# Patient Record
Sex: FEMALE | Race: WHITE | NOT HISPANIC OR LATINO | Employment: FULL TIME | URBAN - METROPOLITAN AREA
[De-identification: names, ages, dates, MRNs, and addresses within clinical notes are randomized per-mention and may not be internally consistent; named-entity substitution may affect disease eponyms.]

---

## 2017-01-28 ENCOUNTER — HOSPITAL ENCOUNTER (OUTPATIENT)
Dept: RADIOLOGY | Facility: MEDICAL CENTER | Age: 52
Discharge: HOME | End: 2017-01-28
Payer: COMMERCIAL

## 2017-01-28 DIAGNOSIS — Z12.31 ENCOUNTER FOR SCREENING MAMMOGRAM FOR MALIGNANT NEOPLASM OF BREAST: ICD-10-CM

## 2017-01-28 PROCEDURE — G0202 SCR MAMMO BI INCL CAD: HCPCS

## 2018-05-21 ENCOUNTER — TRANSCRIBE ORDERS (OUTPATIENT)
Dept: ADMINISTRATIVE | Facility: HOSPITAL | Age: 53
End: 2018-05-21

## 2018-05-21 DIAGNOSIS — Z12.31 ENCOUNTER FOR SCREENING MAMMOGRAM FOR MALIGNANT NEOPLASM OF BREAST: Primary | ICD-10-CM

## 2018-05-25 ENCOUNTER — HOSPITAL ENCOUNTER (OUTPATIENT)
Dept: MAMMOGRAPHY | Facility: CLINIC | Age: 53
Discharge: HOME | End: 2018-05-25
Payer: COMMERCIAL

## 2018-05-25 DIAGNOSIS — Z12.31 ENCOUNTER FOR SCREENING MAMMOGRAM FOR MALIGNANT NEOPLASM OF BREAST: ICD-10-CM

## 2018-05-25 PROCEDURE — 77067 SCR MAMMO BI INCL CAD: CPT

## 2018-11-06 ENCOUNTER — TELEPHONE (OUTPATIENT)
Dept: OBGYN CLINIC | Facility: HOSPITAL | Age: 53
End: 2018-11-06

## 2018-11-06 ENCOUNTER — APPOINTMENT (OUTPATIENT)
Dept: RADIOLOGY | Facility: MEDICAL CENTER | Age: 53
End: 2018-11-06
Payer: COMMERCIAL

## 2018-11-06 ENCOUNTER — OFFICE VISIT (OUTPATIENT)
Dept: URGENT CARE | Facility: MEDICAL CENTER | Age: 53
End: 2018-11-06
Payer: COMMERCIAL

## 2018-11-06 VITALS
RESPIRATION RATE: 16 BRPM | DIASTOLIC BLOOD PRESSURE: 81 MMHG | SYSTOLIC BLOOD PRESSURE: 120 MMHG | OXYGEN SATURATION: 95 % | HEIGHT: 64 IN | TEMPERATURE: 99.2 F | HEART RATE: 87 BPM | BODY MASS INDEX: 28.68 KG/M2 | WEIGHT: 168 LBS

## 2018-11-06 DIAGNOSIS — S83.002A SUBLUXATION OF LEFT PATELLA, INITIAL ENCOUNTER: ICD-10-CM

## 2018-11-06 DIAGNOSIS — M25.462 EFFUSION OF BURSA OF LEFT KNEE: ICD-10-CM

## 2018-11-06 DIAGNOSIS — M25.562 ACUTE PAIN OF LEFT KNEE: Primary | ICD-10-CM

## 2018-11-06 PROCEDURE — 73562 X-RAY EXAM OF KNEE 3: CPT

## 2018-11-06 PROCEDURE — 99213 OFFICE O/P EST LOW 20 MIN: CPT | Performed by: PHYSICIAN ASSISTANT

## 2018-11-06 RX ORDER — IBUPROFEN 800 MG/1
800 TABLET ORAL EVERY 6 HOURS PRN
Qty: 30 TABLET | Refills: 0 | Status: SHIPPED | OUTPATIENT
Start: 2018-11-06 | End: 2019-02-01 | Stop reason: ALTCHOICE

## 2018-11-06 NOTE — PATIENT INSTRUCTIONS
X-ray left knee performed in office-patellar dislocation on xray  Prescription sent to pharmacy for ibuprofen-use as directed  Rest, elevate leg, ice  Follow up with orthopedics immediately  Proceed to  ER if symptoms worsen  Patellar Dislocation   WHAT YOU NEED TO KNOW:   A patellar dislocation occurs when your patella (kneecap) is forced out of place  It can be caused by a fall or a direct blow to your knee  It can also happen if your knee twists or rotates  It is most likely to happen during physical activity, such as sports, [employer] training, or dance  DISCHARGE INSTRUCTIONS:   You may need any of the following:  · NSAIDs , such as ibuprofen, help decrease swelling, pain, and fever  This medicine is available with or without a doctor's order  NSAIDs can cause stomach bleeding or kidney problems in certain people  If you take blood thinner medicine, always ask if NSAIDs are safe for you  Always read the medicine label and follow directions  Do not give these medicines to children under 10months of age without direction from your child's healthcare provider  · Acetaminophen  decreases pain  It is available without a doctor's order  Ask how much to take and how often to take it  Follow directions  Acetaminophen can cause liver damage if not taken correctly  · Prescription pain medicine  may be given to decrease your pain  Do not wait until the pain is severe before you take this medicine  · Take your medicine as directed  Contact your healthcare provider if you think your medicine is not helping or if you have side effects  Tell him of her if you are allergic to any medicine  Keep a list of the medicines, vitamins, and herbs you take  Include the amounts, and when and why you take them  Bring the list or the pill bottles to follow-up visits  Carry your medicine list with you in case of an emergency    Follow up with your healthcare provider or orthopedic surgeon within 2 weeks or as directed:  Write down your questions so you remember to ask them during your visits  Self-care:   · Ice  helps decrease swelling and pain  Ice may also help prevent tissue damage  Use an ice pack, or put crushed ice in a plastic bag  Cover it with a towel and place it on your knee for 15 to 20 minutes every hour or as directed  · Raise your knee  above the level of your heart as often as you can  This will help decrease swelling and pain  Prop your knee on pillows or blankets to keep it elevated comfortably  · Immobilize your knee  for 3 to 6 weeks or as directed  Your healthcare provider may give you a brace, cast, or splint  He may tell you to wrap your knee with athletic tape  This is done to decrease pain and hold your knee joint still to help it heal  This may also help prevent your kneecap from dislocating again  · Use crutches  if your healthcare provider tells you not to put weight on your injured knee  Healthcare providers will show you how to use crutches  You may need them for 4 to 6 weeks  · Physical therapy  teaches you exercises to increase the range of motion in your knee  Exercises make your knee stronger, increase balance, and decrease pain  You may also need to strengthen your stomach, back, hip, and leg muscles  You must continue these exercises after physical therapy ends to help prevent another dislocation  Contact your healthcare provider:   · You have more knee pain  · Your knee feels like it is going to give out  · You have questions or concerns about your condition or care  Return to the emergency department if:   · Your kneecap dislocates again  · You have severe pain and swelling in your knee  © 2017 [facility] Information is for End User's use only and may not be sold, redistributed or otherwise used for commercial purposes   All illustrations and images included in CareNotes® are the copyrighted property of [employer]  or [facility] [employer]  The above information is an  only  It is not intended as medical advice for individual conditions or treatments  Talk to your doctor, nurse or pharmacist before following any medical regimen to see if it is safe and effective for you

## 2018-11-07 ENCOUNTER — OFFICE VISIT (OUTPATIENT)
Dept: OBGYN CLINIC | Facility: CLINIC | Age: 53
End: 2018-11-07
Payer: COMMERCIAL

## 2018-11-07 VITALS
BODY MASS INDEX: 29.99 KG/M2 | HEIGHT: 65 IN | HEART RATE: 103 BPM | WEIGHT: 180 LBS | SYSTOLIC BLOOD PRESSURE: 120 MMHG | DIASTOLIC BLOOD PRESSURE: 83 MMHG

## 2018-11-07 DIAGNOSIS — M17.12 PRIMARY OSTEOARTHRITIS OF LEFT KNEE: ICD-10-CM

## 2018-11-07 DIAGNOSIS — M25.362 KNEE INSTABILITY, LEFT: ICD-10-CM

## 2018-11-07 DIAGNOSIS — M25.562 PAIN AND SWELLING OF LEFT KNEE: ICD-10-CM

## 2018-11-07 DIAGNOSIS — M25.462 EFFUSION OF LEFT KNEE: Primary | ICD-10-CM

## 2018-11-07 DIAGNOSIS — M22.2X2 PATELLOFEMORAL SYNDROME, LEFT: ICD-10-CM

## 2018-11-07 DIAGNOSIS — M25.462 PAIN AND SWELLING OF LEFT KNEE: ICD-10-CM

## 2018-11-07 PROCEDURE — 20610 DRAIN/INJ JOINT/BURSA W/O US: CPT | Performed by: FAMILY MEDICINE

## 2018-11-07 PROCEDURE — 99204 OFFICE O/P NEW MOD 45 MIN: CPT | Performed by: FAMILY MEDICINE

## 2018-11-07 RX ORDER — TRIAMCINOLONE ACETONIDE 40 MG/ML
40 INJECTION, SUSPENSION INTRA-ARTICULAR; INTRAMUSCULAR
Status: COMPLETED | OUTPATIENT
Start: 2018-11-07 | End: 2018-11-07

## 2018-11-07 RX ORDER — BUPIVACAINE HYDROCHLORIDE 2.5 MG/ML
8 INJECTION, SOLUTION INFILTRATION; PERINEURAL
Status: COMPLETED | OUTPATIENT
Start: 2018-11-07 | End: 2018-11-07

## 2018-11-07 RX ADMIN — TRIAMCINOLONE ACETONIDE 40 MG: 40 INJECTION, SUSPENSION INTRA-ARTICULAR; INTRAMUSCULAR at 15:24

## 2018-11-07 RX ADMIN — BUPIVACAINE HYDROCHLORIDE 8 ML: 2.5 INJECTION, SOLUTION INFILTRATION; PERINEURAL at 15:24

## 2018-11-07 NOTE — LETTER
November 7, 2018     Yas Frausto, DO  [address] [address]    Patient: Abdi Sheldon   YOB: 1965   Date of Visit: 11/7/2018       Dear Dr Payne Fus: Thank you for referring Abdi Sheldon to me for evaluation  Below are my notes for this consultation  If you have questions, please do not hesitate to call me  I look forward to following your patient along with you  Sincerely,        [employer], DO        CC: No Recipients  [employer], DO  11/7/2018  4:44 PM  Sign at close encounter  Assessment/Plan:  Assessment/Plan   Diagnoses and all orders for this visit:    Effusion of left knee  -     Large joint arthrocentesis  -     MRI knee left  wo contrast; Future    Pain and swelling of left knee  -     Large joint arthrocentesis  -     MRI knee left  wo contrast; Future    Knee instability, left  -     MRI knee left  wo contrast; Future    Primary osteoarthritis of left knee    Patellofemoral syndrome, left        46year-old female with left knee pain  Discussed with patient physical exam, radiographs, impression, plan  X-ray of the left knee noted for tricompartmental arthritis with severe narrowing of the lateral patellofemoral joint and lateral subluxation patella, with mild to moderate narrowing of the lateral joint compartment, and mild tibiofemoral translation  Physical exam is noted for moderate effusion about the left knee and tenderness of the medial and lateral joint lines, tenderness upon palpation of patellar tendon, with weakness in knee extension of 2/5,  with passive range of motion extension to -5°  and flexion to 110°  I discussed treatment option of knee aspiration with corticosteroid injection to which she agreed  Knee aspiration was attempted and 80 cc of bloody aspirate was removed, so corticosteroid administration was deferred   Given the finding of a bloody aspirate, weakness with knee extension and patellar tendon tenderness, and x-ray findings of tibiofemoral translation, there is clinical suspicion for patellar tendon injury as well as ACL disruption  At this time I will have her continue wearing knee immobilizer and use of crutches  I will refer her for MRI of the left knee to evaluate for internal derangement  She will follow up with me after getting MRI done    Subjective:   Patient ID: Elena Limon is a 46 y o  female  Chief Complaint   Patient presents with    Left Knee - Pain       50-year-old female presents for evaluation of left knee pain, swelling, and instability of 2 days duration  She denies any trauma or inciting event  Pain described as gradual onset, generalized to the knee, constant, associated with swelling, achy and sharp, worse with bearing weight, and improved at rest   She presented to Urgent Care where x-ray evaluation was noted for significant arthritis, lateral patellar subluxation, and moderate joint effusion, and she will provided with knee immobilizer and crutches  She was prescribed ibuprofen which she states she has been taking twice a day which was providing mild relief  Prior to this she denies any knee pain or knee issues  Knee Pain   This is a new problem  The current episode started in the past 7 days  The problem occurs constantly  The problem has been unchanged  Associated symptoms include arthralgias and joint swelling  Pertinent negatives include no abdominal pain, chest pain, chills, fever, numbness, rash, sore throat or weakness  The symptoms are aggravated by standing and walking  She has tried rest, NSAIDs, ice and immobilization for the symptoms  The treatment provided mild relief  The following portions of the patient's history were reviewed and updated as appropriate: She  has a past medical history of Arthritis  She  has a past surgical history that includes Hip surgery (Left, 2014)  Her family history includes Cancer in her father and mother    She  reports that she has never smoked  She has never used smokeless tobacco  She reports that she does not drink alcohol or use drugs  She is allergic to amoxicillin and azithromycin       Review of Systems   Constitutional: Negative for chills and fever  HENT: Negative for sore throat  Eyes: Negative for visual disturbance  Respiratory: Negative for shortness of breath  Cardiovascular: Negative for chest pain  Gastrointestinal: Negative for abdominal pain  Genitourinary: Negative for flank pain  Musculoskeletal: Positive for arthralgias and joint swelling  Skin: Negative for rash and wound  Neurological: Negative for weakness and numbness  Hematological: Does not bruise/bleed easily  Psychiatric/Behavioral: Negative for self-injury  Objective:  Vitals:    11/07/18 1423   BP: 120/83   Pulse: 103   Weight: 81 6 kg (180 lb)   Height: 5' 4 5" (1 638 m)     Left Knee Exam     Tenderness   The patient is experiencing tenderness in the lateral joint line, patella, patellar tendon and medial joint line (Suprapatellar)  Range of Motion   Left knee extension: No active knee extension  Flexion: 100     Tests   Drawer:       Left knee anterior drawer test: Equivocal        Varus: negative  Valgus: negative    Other   Swelling: moderate  Effusion: effusion present    Comments:  2/5 strength knee extension          Observations   Left Knee   Positive for effusion  Physical Exam   Constitutional: She is oriented to person, place, and time  She appears well-developed  No distress  HENT:   Head: Normocephalic  Eyes: Conjunctivae are normal    Neck: No tracheal deviation present  Cardiovascular: Normal rate  Pulmonary/Chest: Effort normal  No respiratory distress  Abdominal: She exhibits no distension  Musculoskeletal:        Left knee: She exhibits effusion  Neurological: She is alert and oriented to person, place, and time  Skin: Skin is warm and dry     Psychiatric: She has a normal mood and affect  Her behavior is normal    Nursing note and vitals reviewed  I have personally reviewed pertinent films in PACS and my interpretation is Tricompartmental osteoarthritis with lateral patella subluxation      Large joint arthrocentesis  Date/Time: 11/7/2018 3:24 PM  Consent given by: patient  Site marked: site marked  Timeout: Immediately prior to procedure a time out was called to verify the correct patient, procedure, equipment, support staff and site/side marked as required   Supporting Documentation  Indications: pain   Procedure Details  Location: knee - L knee  Preparation: Patient was prepped and draped in the usual sterile fashion  Needle size: 18 G  Approach: superior  Medications administered: 8 mL bupivacaine 0 25 %; 40 mg triamcinolone acetonide 40 mg/mL    Aspirate amount: 80 mL  Aspirate: bloody  Patient tolerance: patient tolerated the procedure well with no immediate complications  Dressing:  Sterile dressing applied

## 2018-11-07 NOTE — PROGRESS NOTES
Assessment/Plan:  Assessment/Plan   Diagnoses and all orders for this visit:    Effusion of left knee  -     Large joint arthrocentesis  -     MRI knee left  wo contrast; Future    Pain and swelling of left knee  -     Large joint arthrocentesis  -     MRI knee left  wo contrast; Future    Knee instability, left  -     MRI knee left  wo contrast; Future    Primary osteoarthritis of left knee    Patellofemoral syndrome, left        46year-old female with left knee pain  Discussed with patient physical exam, radiographs, impression, plan  X-ray of the left knee noted for tricompartmental arthritis with severe narrowing of the lateral patellofemoral joint and lateral subluxation patella, with mild to moderate narrowing of the lateral joint compartment, and mild tibiofemoral translation  Physical exam is noted for moderate effusion about the left knee and tenderness of the medial and lateral joint lines, tenderness upon palpation of patellar tendon, with weakness in knee extension of 2/5,  with passive range of motion extension to -5° and flexion to 110°  I discussed treatment option of knee aspiration with corticosteroid injection to which she agreed  Knee aspiration was attempted and 80 cc of bloody aspirate was removed, so corticosteroid administration was deferred  Given the finding of a bloody aspirate, weakness with knee extension and patellar tendon tenderness, and x-ray findings of tibiofemoral translation, there is clinical suspicion for patellar tendon injury as well as ACL disruption  At this time I will have her continue wearing knee immobilizer and use of crutches  I will refer her for MRI of the left knee to evaluate for internal derangement  She will follow up with me after getting MRI done    Subjective:   Patient ID: Jose Maldonado is a 46 y o  female    Chief Complaint   Patient presents with    Left Knee - Pain       76-year-old female presents for evaluation of left knee pain, swelling, and instability of 2 days duration  She denies any trauma or inciting event  Pain described as gradual onset, generalized to the knee, constant, associated with swelling, achy and sharp, worse with bearing weight, and improved at rest   She presented to Urgent Care where x-ray evaluation was noted for significant arthritis, lateral patellar subluxation, and moderate joint effusion, and she will provided with knee immobilizer and crutches  She was prescribed ibuprofen which she states she has been taking twice a day which was providing mild relief  Prior to this she denies any knee pain or knee issues  Knee Pain   This is a new problem  The current episode started in the past 7 days  The problem occurs constantly  The problem has been unchanged  Associated symptoms include arthralgias and joint swelling  Pertinent negatives include no abdominal pain, chest pain, chills, fever, numbness, rash, sore throat or weakness  The symptoms are aggravated by standing and walking  She has tried rest, NSAIDs, ice and immobilization for the symptoms  The treatment provided mild relief  The following portions of the patient's history were reviewed and updated as appropriate: She  has a past medical history of Arthritis  She  has a past surgical history that includes Hip surgery (Left, 2014)  Her family history includes Cancer in her father and mother  She  reports that she has never smoked  She has never used smokeless tobacco  She reports that she does not drink alcohol or use drugs  She is allergic to amoxicillin and azithromycin       Review of Systems   Constitutional: Negative for chills and fever  HENT: Negative for sore throat  Eyes: Negative for visual disturbance  Respiratory: Negative for shortness of breath  Cardiovascular: Negative for chest pain  Gastrointestinal: Negative for abdominal pain  Genitourinary: Negative for flank pain     Musculoskeletal: Positive for arthralgias and joint swelling  Skin: Negative for rash and wound  Neurological: Negative for weakness and numbness  Hematological: Does not bruise/bleed easily  Psychiatric/Behavioral: Negative for self-injury  Objective:  Vitals:    11/07/18 1423   BP: 120/83   Pulse: 103   Weight: 81 6 kg (180 lb)   Height: 5' 4 5" (1 638 m)     Left Knee Exam     Tenderness   The patient is experiencing tenderness in the lateral joint line, patella, patellar tendon and medial joint line (Suprapatellar)  Range of Motion   Left knee extension: No active knee extension  Flexion: 100     Tests   Drawer:       Left knee anterior drawer test: Equivocal        Varus: negative  Valgus: negative    Other   Swelling: moderate  Effusion: effusion present    Comments:  2/5 strength knee extension          Observations   Left Knee   Positive for effusion  Physical Exam   Constitutional: She is oriented to person, place, and time  She appears well-developed  No distress  HENT:   Head: Normocephalic  Eyes: Conjunctivae are normal    Neck: No tracheal deviation present  Cardiovascular: Normal rate  Pulmonary/Chest: Effort normal  No respiratory distress  Abdominal: She exhibits no distension  Musculoskeletal:        Left knee: She exhibits effusion  Neurological: She is alert and oriented to person, place, and time  Skin: Skin is warm and dry  Psychiatric: She has a normal mood and affect  Her behavior is normal    Nursing note and vitals reviewed  I have personally reviewed pertinent films in PACS and my interpretation is Tricompartmental osteoarthritis with lateral patella subluxation      Large joint arthrocentesis  Date/Time: 11/7/2018 3:24 PM  Consent given by: patient  Site marked: site marked  Timeout: Immediately prior to procedure a time out was called to verify the correct patient, procedure, equipment, support staff and site/side marked as required   Supporting Documentation  Indications: pain   Procedure Details  Location: knee - L knee  Preparation: Patient was prepped and draped in the usual sterile fashion  Needle size: 18 G  Approach: superior  Medications administered: 8 mL bupivacaine 0 25 %; 40 mg triamcinolone acetonide 40 mg/mL    Aspirate amount: 80 mL  Aspirate: bloody  Patient tolerance: patient tolerated the procedure well with no immediate complications  Dressing:  Sterile dressing applied

## 2018-11-07 NOTE — PROGRESS NOTES
[facility]        NAME: Shamar Shelton is a 46 y o  female  : 1965    MRN: [id number]  DATE: 2018  TIME: 3:42 PM    Assessment and Plan   Acute pain of left knee [M25 562]  1  Acute pain of left knee  XR knee 3 vw left non injury     Dislocation of left patella, initial encounter    Patient Instructions   X-ray left knee performed in office-patellar dislocation on xray  Her left leg was placed in a knee immobilizer  Prescription sent to pharmacy for ibuprofen-use as directed  Rest, elevate leg, ice  Follow up with orthopedics immediately  Proceed to  ER if symptoms worsen  Chief Complaint     Chief Complaint   Patient presents with    Knee Pain     noticed yesterday evening that she was having increased swelling and pin in her left knee  Today she has limited ROM and is having trouble bearing weight          History of Present Illness   The patient is a 48-year-old female presents with acute left knee pain that started yesterday  She states that she was active yesterday but not was not doing anything out of the ordinary  She denies a prior history of a knee injury  No prior surgeries to her left knee  She has positive pain with palpation of the anterior portion of the knee at the patellar region  Positive edema  Negative ecchymosis  She is having difficulty ambulating secondary to the pain  Decreased range of motion secondary to pain  Negative fever or chills  Negative erythema of the joint  HPI    Review of Systems   Review of Systems   Musculoskeletal:        Left knee pain   All other systems reviewed and are negative  Current Medications     No current outpatient prescriptions on file      Current Allergies     Allergies as of 2018 - Reviewed 2018   Allergen Reaction Noted    Amoxicillin  2005    Azithromycin  2005            The following portions of the patient's history were reviewed and updated as appropriate: allergies, current medications, past family history, past medical history, past social history, past surgical history and problem list      History reviewed  No pertinent past medical history  Past Surgical History:   Procedure Laterality Date    HIP SURGERY Left 2014       Family History   Problem Relation Age of Onset    Cancer Mother     Cancer Father          Medications have been verified          Objective   /81   Pulse 87   Temp 99 2 °F (37 3 °C) (Temporal)   Resp 16   Ht 5' 4" (1 626 m)   Wt 76 2 kg (168 lb)   SpO2 95%   BMI 28 84 kg/m²        Physical Exam     Physical Exam

## 2018-11-09 NOTE — PROGRESS NOTES
[facility]        NAME: Linda Corcoran is a 46 y o  female  : 1965    MRN: [id number]  DATE: 2018  TIME: 8:40 AM    Assessment and Plan   Acute pain of left knee [M25 562]  1  Acute pain of left knee  XR knee 3 vw left non injury    ibuprofen (MOTRIN) 800 mg tablet    Elastic Bandages & Supports (SLIP-ON KNEE BRACE) MISC   2  Subluxation of left patella, initial encounter  Knee Immobilizer   3  Effusion of bursa of left knee           Patient Instructions   Xray left knee performed in office-left knee effusion with subluxation of patella  Patient placed in a left knee immobilizer and provided with crutches  Prescription sent to pharmacy for ibuprofen 800mg-use as directed  Follow up with Orthopedic physician ASAP  Proceed to  ER if symptoms worsen  Chief Complaint     Chief Complaint   Patient presents with    Knee Pain     noticed yesterday evening that she was having increased swelling and pin in her left knee  Today she has limited ROM and is having trouble bearing weight          History of Present Illness   The patient is a 58-year-old female presents with acute left knee pain that started yesterday  She states that she was active yesterday but not was not doing anything out of the ordinary  She denies a prior history of a knee injury  No prior surgeries to her left knee  She has positive pain with palpation of the anterior portion of the knee at the patellar region  Positive edema  Negative ecchymosis  She is having difficulty ambulating secondary to the pain  Decreased range of motion secondary to pain  Negative fever or chills  Negative erythema of the joint  HPI    Review of Systems   Review of Systems   Constitutional: Negative for chills and fever  Musculoskeletal: Positive for gait problem and joint swelling  Left knee pain     Skin: Negative for color change, pallor, rash and wound  Hematological: Does not bruise/bleed easily     All other systems reviewed and are negative  Current Medications       Current Outpatient Prescriptions:     Elastic Bandages & Supports (SLIP-ON KNEE BRACE) MISC, Neoprene knee sleeve, Disp: 1 each, Rfl: 0    ibuprofen (MOTRIN) 800 mg tablet, Take 1 tablet (800 mg total) by mouth every 6 (six) hours as needed for mild pain, Disp: 30 tablet, Rfl: 0    Current Allergies     Allergies as of 11/06/2018 - Reviewed 11/06/2018   Allergen Reaction Noted    Amoxicillin  01/17/2005    Azithromycin  01/17/2005            The following portions of the patient's history were reviewed and updated as appropriate: allergies, current medications, past family history, past medical history, past social history, past surgical history and problem list      Past Medical History:   Diagnosis Date    Arthritis     hands       Past Surgical History:   Procedure Laterality Date    HIP SURGERY Left 2014       Family History   Problem Relation Age of Onset    Cancer Mother     Cancer Father          Medications have been verified  Objective   /81   Pulse 87   Temp 99 2 °F (37 3 °C) (Temporal)   Resp 16   Ht 5' 4" (1 626 m)   Wt 76 2 kg (168 lb)   SpO2 95%   BMI 28 84 kg/m²        Physical Exam     Physical Exam   Constitutional: She appears well-developed and well-nourished  She appears distressed (Appears to be in pain )  HENT:   Head: Normocephalic and atraumatic  Pulmonary/Chest: Effort normal  No respiratory distress  Musculoskeletal:        Left knee: She exhibits decreased range of motion, swelling, effusion, deformity, abnormal patellar mobility and bony tenderness  She exhibits no ecchymosis, no laceration, no erythema, normal alignment and no LCL laxity  Tenderness found  Patellar tendon tenderness noted  No medial joint line, no lateral joint line, no MCL and no LCL tenderness noted  Left knee with significant edema and effusion  Unable to palpate or mobilize patella secondary to pain   Abnormal appearance of patella location  Skin: Skin is warm and dry  No rash noted  She is not diaphoretic  No erythema  No pallor  Psychiatric: She has a normal mood and affect  Her behavior is normal    Nursing note and vitals reviewed

## 2018-11-16 ENCOUNTER — HOSPITAL ENCOUNTER (OUTPATIENT)
Dept: MRI IMAGING | Facility: HOSPITAL | Age: 53
Discharge: HOME | End: 2018-11-16
Attending: FAMILY MEDICINE
Payer: COMMERCIAL

## 2018-11-16 DIAGNOSIS — M25.462 PAIN AND SWELLING OF LEFT KNEE: ICD-10-CM

## 2018-11-16 DIAGNOSIS — M25.362 KNEE INSTABILITY, LEFT: ICD-10-CM

## 2018-11-16 DIAGNOSIS — M25.462 EFFUSION OF LEFT KNEE: ICD-10-CM

## 2018-11-16 DIAGNOSIS — M25.562 PAIN AND SWELLING OF LEFT KNEE: ICD-10-CM

## 2018-11-16 PROCEDURE — 73721 MRI JNT OF LWR EXTRE W/O DYE: CPT

## 2018-11-26 ENCOUNTER — OFFICE VISIT (OUTPATIENT)
Dept: OBGYN CLINIC | Facility: CLINIC | Age: 53
End: 2018-11-26
Payer: COMMERCIAL

## 2018-11-26 VITALS — HEIGHT: 65 IN | WEIGHT: 178 LBS | BODY MASS INDEX: 29.66 KG/M2

## 2018-11-26 DIAGNOSIS — M22.2X1 PATELLOFEMORAL SYNDROME OF BOTH KNEES: ICD-10-CM

## 2018-11-26 DIAGNOSIS — M25.362 PATELLAR INSTABILITY OF LEFT KNEE: ICD-10-CM

## 2018-11-26 DIAGNOSIS — M22.2X2 PATELLOFEMORAL SYNDROME OF BOTH KNEES: ICD-10-CM

## 2018-11-26 DIAGNOSIS — R29.898 WEAKNESS OF BOTH HIPS: ICD-10-CM

## 2018-11-26 DIAGNOSIS — M17.12 PRIMARY OSTEOARTHRITIS OF LEFT KNEE: Primary | ICD-10-CM

## 2018-11-26 DIAGNOSIS — M25.462 EFFUSION OF LEFT KNEE: ICD-10-CM

## 2018-11-26 PROCEDURE — 99213 OFFICE O/P EST LOW 20 MIN: CPT | Performed by: FAMILY MEDICINE

## 2018-11-26 PROCEDURE — 20610 DRAIN/INJ JOINT/BURSA W/O US: CPT | Performed by: FAMILY MEDICINE

## 2018-11-26 RX ORDER — LIDOCAINE HYDROCHLORIDE 5 MG/ML
5 INJECTION, SOLUTION INFILTRATION; PERINEURAL
Status: COMPLETED | OUTPATIENT
Start: 2018-11-26 | End: 2018-11-26

## 2018-11-26 RX ORDER — MELOXICAM 15 MG/1
15 TABLET ORAL DAILY
Qty: 30 TABLET | Refills: 2 | Status: SHIPPED | OUTPATIENT
Start: 2018-11-26 | End: 2019-07-29 | Stop reason: ALTCHOICE

## 2018-11-26 RX ORDER — LIDOCAINE HYDROCHLORIDE 5 MG/ML
4 INJECTION, SOLUTION INFILTRATION; PERINEURAL
Status: COMPLETED | OUTPATIENT
Start: 2018-11-26 | End: 2018-11-26

## 2018-11-26 RX ORDER — TRIAMCINOLONE ACETONIDE 40 MG/ML
40 INJECTION, SUSPENSION INTRA-ARTICULAR; INTRAMUSCULAR
Status: COMPLETED | OUTPATIENT
Start: 2018-11-26 | End: 2018-11-26

## 2018-11-26 RX ADMIN — LIDOCAINE HYDROCHLORIDE 4 ML: 5 INJECTION, SOLUTION INFILTRATION; PERINEURAL at 16:00

## 2018-11-26 RX ADMIN — TRIAMCINOLONE ACETONIDE 40 MG: 40 INJECTION, SUSPENSION INTRA-ARTICULAR; INTRAMUSCULAR at 16:00

## 2018-11-26 RX ADMIN — LIDOCAINE HYDROCHLORIDE 5 ML: 5 INJECTION, SOLUTION INFILTRATION; PERINEURAL at 16:00

## 2018-11-26 NOTE — PROGRESS NOTES
Assessment/Plan:  Assessment/Plan   Diagnoses and all orders for this visit:    Primary osteoarthritis of left knee  -     Large joint arthrocentesis  -     Ambulatory referral to Physical Therapy; Future  -     meloxicam (MOBIC) 15 mg tablet; Take 1 tablet (15 mg total) by mouth daily  -     lidocaine (XYLOCAINE) 0 5 % injection 4 mL; 4 mL by Infiltration route 1 (one) time orthopaedic injection   -     lidocaine (XYLOCAINE) 0 5 % injection 5 mL; 5 mL by Infiltration route 1 (one) time orthopaedic injection   -     triamcinolone acetonide (KENALOG-40) 40 mg/mL injection 40 mg; Inject 40 mg into the joint 1 (one) time orthopaedic injection     Effusion of left knee  -     Large joint arthrocentesis  -     lidocaine (XYLOCAINE) 0 5 % injection 4 mL; 4 mL by Infiltration route 1 (one) time orthopaedic injection   -     lidocaine (XYLOCAINE) 0 5 % injection 5 mL; 5 mL by Infiltration route 1 (one) time orthopaedic injection   -     triamcinolone acetonide (KENALOG-40) 40 mg/mL injection 40 mg; Inject 40 mg into the joint 1 (one) time orthopaedic injection     Patellofemoral syndrome of both knees  -     Ambulatory referral to Physical Therapy; Future    Weakness of both hips  -     Ambulatory referral to Physical Therapy; Future    Patellar instability of left knee  -     Brace        78-year-old female with left knee pain and swelling 3 weeks duration  Discussed with patient MRI findings, impression and plan  MRI of the left knee is noted for significant patellofemoral osteoarthritis, degenerative changes with fraying of the anterolateral meniscus, and multiple areas of articular cartilage defects, and moderate sized effusion  Physical exam is noted for effusion about the knee and there is tenderness of the patella and lateral joint line  She has difficulty with actively extending the knee due to pain  I discussed treatment option of corticosteroid injection to which she agreed    I aspirated 12 cc of bloody fluid from the left knee and administered mixture of 4 cc 0 25% lidocaine and 1 cc Kenalog to the left knee without complication  I discussed regimen of taking oral anti-inflammatory, wearing patellar stabilizing brace, and doing physical therapy to which she agreed  She is to take meloxicam 15 mg once daily with food and start physical therapy as soon as possible and do home exercises as directed  She is also start taking joint supplements in the forms of glucosamine and tumeric daily  She will follow up in 6 weeks at which point she will be evaluated  If pain does not improve or worsens she may return sooner  Subjective:   Patient ID: Shelbi Gagnon is a 46 y o  female  Chief Complaint   Patient presents with    Left Knee - Follow-up, Pain       14-year-old female following up for left knee pain of more than 3 weeks duration  She was last seen 19 days ago at which point 80 cc of bloody fluid was aspirated from her left knee, and there was high suspicion for internal derangement  She was advised to continue with use of knee immobilizer and referred for MRI of the left knee  Today she reports still having pain that is described as generalized to the knee but worse at the anterior lateral aspect, constantly achy if and intermittently sharp, worse with bending the knee and with twisting, and associated swelling  She reports improvement in swelling since aspiration, but still has pain with movement of the knee  Knee Pain   This is a new problem  The current episode started 1 to 4 weeks ago  The problem occurs constantly  The problem has been gradually improving  Associated symptoms include arthralgias and joint swelling  Pertinent negatives include no numbness or weakness  The symptoms are aggravated by standing, walking and twisting  She has tried rest, NSAIDs and immobilization for the symptoms  The treatment provided mild relief                 Review of Systems   Musculoskeletal: Positive for arthralgias and joint swelling  Neurological: Negative for weakness and numbness  Objective:  Vitals:    11/26/18 1525   Weight: 80 7 kg (178 lb)   Height: 5' 4 5" (1 638 m)     Right Knee Exam     Other   Swelling: mild    Comments:  Lateral patellar tilt      Left Knee Exam     Tenderness   The patient is experiencing tenderness in the lateral joint line, patella and patellar tendon  Range of Motion   Left knee extension: No active knee extension  Flexion: 110     Other   Effusion: effusion present    Comments:  2/5 strength knee extension      Right Hip Exam     Muscle Strength   Abduction: 4/5       Left Hip Exam     Muscle Strength   Abduction: 4/5           Observations   Left Knee   Positive for effusion  Physical Exam   Constitutional: She is oriented to person, place, and time  She appears well-developed  No distress  HENT:   Head: Normocephalic  Eyes: Conjunctivae are normal    Neck: No tracheal deviation present  Pulmonary/Chest: Effort normal  No respiratory distress  Abdominal: She exhibits no distension  Musculoskeletal: She exhibits tenderness  Left knee: She exhibits effusion  Neurological: She is alert and oriented to person, place, and time  Skin: Skin is warm and dry  Psychiatric: She has a normal mood and affect  Her behavior is normal    Nursing note and vitals reviewed  I have personally reviewed pertinent films in PACS and my interpretation is Significant patellofemoral arthritis  Knee effusion        Large joint arthrocentesis  Date/Time: 11/26/2018 4:00 PM  Consent given by: patient  Site marked: site marked  Timeout: Immediately prior to procedure a time out was called to verify the correct patient, procedure, equipment, support staff and site/side marked as required   Supporting Documentation  Indications: pain   Procedure Details  Location: knee - L knee  Preparation: Patient was prepped and draped in the usual sterile fashion  Needle size: 18 G  Ultrasound guidance: no  Approach: superior  Medications administered: 4 mL lidocaine 0 5 %; 5 mL lidocaine 0 5 %; 40 mg triamcinolone acetonide 40 mg/mL    Aspirate amount: 12 mL  Aspirate: bloody  Patient tolerance: patient tolerated the procedure well with no immediate complications  Dressing:  Sterile dressing applied

## 2019-02-01 ENCOUNTER — OFFICE VISIT (OUTPATIENT)
Dept: FAMILY MEDICINE CLINIC | Facility: CLINIC | Age: 54
End: 2019-02-01
Payer: COMMERCIAL

## 2019-02-01 VITALS
SYSTOLIC BLOOD PRESSURE: 144 MMHG | HEIGHT: 64 IN | RESPIRATION RATE: 16 BRPM | WEIGHT: 180 LBS | TEMPERATURE: 97.7 F | BODY MASS INDEX: 30.73 KG/M2 | DIASTOLIC BLOOD PRESSURE: 86 MMHG | HEART RATE: 80 BPM

## 2019-02-01 DIAGNOSIS — Z00.00 ANNUAL PHYSICAL EXAM: Primary | ICD-10-CM

## 2019-02-01 DIAGNOSIS — Z12.11 COLON CANCER SCREENING: ICD-10-CM

## 2019-02-01 DIAGNOSIS — Z11.59 NEED FOR HEPATITIS C SCREENING TEST: ICD-10-CM

## 2019-02-01 DIAGNOSIS — Z13.6 SCREENING FOR CARDIOVASCULAR CONDITION: ICD-10-CM

## 2019-02-01 DIAGNOSIS — Z79.899 ENCOUNTER FOR LONG-TERM CURRENT USE OF MEDICATION: ICD-10-CM

## 2019-02-01 PROCEDURE — 99396 PREV VISIT EST AGE 40-64: CPT | Performed by: FAMILY MEDICINE

## 2019-02-01 NOTE — PROGRESS NOTES
FAMILY PRACTICE HEALTH MAINTENANCE OFFICE VISIT  [facility] - [address]    NAME: Louisa Lindsay  AGE: 46 y o  SEX: female  : 1965     DATE: 2019    Assessment and Plan     Problem List Items Addressed This Visit     None      Visit Diagnoses     Annual physical exam    -  Primary    Colon cancer screening        Relevant Orders    Ambulatory referral to Gastroenterology    Screening for cardiovascular condition        Relevant Orders    Comprehensive metabolic panel    Lipid Panel with Direct LDL reflex    Encounter for long-term current use of medication        Relevant Orders    CBC    TSH, 3rd generation    Need for hepatitis C screening test        Relevant Orders    Hepatitis C antibody            · Patient Counseling:   · Nutrition: Stressed importance of a well balanced diet, moderation of sodium/saturated fat, caloric balance and sufficient intake of fiber  · Exercise: Stressed the importance of regular exercise with a goal of 150 minutes per week  · Dental Health: Discussed daily flossing and brushing and regular dental visits     · Immunizations reviewed Adacel and Flu shot up to date  · Discussed benefits of screening colonoscopy  BMI Counseling: Body mass index is 30 9 kg/m²  Discussed with patient's BMI with her  The BMI is above average  BMI counseling and education was provided to the patient  Exercise recommendations include exercising 3-5 times per week  Return in about 1 year (around 2020) for Annual physical         Chief Complaint     Chief Complaint   Patient presents with    Physical Exam     lj       History of Present Illness     She is taking care of her 9year old grand son after school  Well Adult Physical   Patient here for a comprehensive physical exam       Diet and Physical Activity  Diet: well balanced diet  Weight concerns: Patient has class 1 obesity (BMI 30-34  9)  Exercise: infrequently      Depression Screen  PHQ-9 Depression Screening    PHQ-9:    Frequency of the following problems over the past two weeks:       Little interest or pleasure in doing things:  0 - not at all  Feeling down, depressed, or hopeless:  0 - not at all  PHQ-2 Score:  0          General Health  Hearing: Normal:  bilateral  Vision: wears contacts  Dental: regular dental visits    Reproductive Health  Menopause age 47      The following portions of the patient's history were reviewed and updated as appropriate: allergies, current medications, past family history, past medical history, past social history, past surgical history and problem list     Review of Systems     Review of Systems   Respiratory: Negative  Cardiovascular: Negative          Past Medical History     Past Medical History:   Diagnosis Date    Arthritis     hands       Past Surgical History     Past Surgical History:   Procedure Laterality Date    HIP SURGERY Left 2014       Social History     Social History     Social History    Marital status: Single     Spouse name: N/A    Number of children: N/A    Years of education: N/A     Social History Main Topics    Smoking status: Never Smoker    Smokeless tobacco: Never Used    Alcohol use No    Drug use: No    Sexual activity: Not Currently     Other Topics Concern    None     Social History Narrative    None       Family History     Family History   Problem Relation Age of Onset    Breast cancer Mother     Anxiety disorder Mother     Cancer Father         mesothelioma     Other Brother         possible liver mass    Diabetes Paternal Grandmother        Current Medications       Current Outpatient Prescriptions:     Elastic Bandages & Supports (SLIP-ON KNEE BRACE) MISC, Neoprene knee sleeve, Disp: 1 each, Rfl: 0    meloxicam (MOBIC) 15 mg tablet, Take 1 tablet (15 mg total) by mouth daily, Disp: 30 tablet, Rfl: 2     Allergies     Allergies   Allergen Reactions    Amoxicillin      Reaction Date: [id number];     Azithromycin      Reaction Date: [id number]; Objective     /86   Pulse 80   Temp 97 7 °F (36 5 °C)   Resp 16   Ht 5' 4" (1 626 m)   Wt 81 6 kg (180 lb)   BMI 30 90 kg/m²      Physical Exam   Constitutional: She is oriented to person, place, and time  She appears well-developed and well-nourished  HENT:   Head: Normocephalic and atraumatic  Right Ear: External ear normal    Left Ear: External ear normal    Mouth/Throat: Oropharynx is clear and moist    Eyes: Pupils are equal, round, and reactive to light  Neck: Normal range of motion  Cardiovascular: Normal rate, regular rhythm and normal heart sounds  Exam reveals no friction rub  No murmur heard  Pulmonary/Chest: Effort normal and breath sounds normal  No respiratory distress  She has no wheezes  She has no rales  Abdominal: Soft  Bowel sounds are normal  She exhibits no distension and no mass  There is no tenderness  There is no rebound and no guarding  Musculoskeletal: Normal range of motion  She exhibits no edema  Neurological: She is alert and oriented to person, place, and time  She has normal reflexes  Skin: Skin is warm  Psychiatric: She has a normal mood and affect  Her behavior is normal  Thought content normal    Nursing note and vitals reviewed           Visual Acuity Screening    Right eye Left eye Both eyes   Without correction:      With correction: 20/25 20/25 20/25       Health Maintenance     Health Maintenance   Topic Date Due    Hepatitis C Screening  1965    CRC Screening: Colonoscopy  1965    MAMMOGRAM  05/25/2019    Depression Screening PHQ  02/01/2020    DTaP,Tdap,and Td Vaccines (2 - Td) 09/22/2020    PAP SMEAR  07/06/2021    INFLUENZA VACCINE  Completed     Immunization History   Administered Date(s) Administered    Influenza 11/19/2018    Tdap 09/22/2010    Tuberculin Skin Test-PPD Intradermal 07/31/2007       Sarah Millan DO  [address] [facility]

## 2019-04-03 ENCOUNTER — OFFICE VISIT (OUTPATIENT)
Dept: FAMILY MEDICINE CLINIC | Facility: CLINIC | Age: 54
End: 2019-04-03
Payer: COMMERCIAL

## 2019-04-03 VITALS
TEMPERATURE: 99.4 F | RESPIRATION RATE: 16 BRPM | HEIGHT: 64 IN | SYSTOLIC BLOOD PRESSURE: 122 MMHG | BODY MASS INDEX: 31.07 KG/M2 | HEART RATE: 82 BPM | DIASTOLIC BLOOD PRESSURE: 78 MMHG | WEIGHT: 182 LBS

## 2019-04-03 DIAGNOSIS — H10.32 ACUTE BACTERIAL CONJUNCTIVITIS OF LEFT EYE: ICD-10-CM

## 2019-04-03 DIAGNOSIS — J06.9 ACUTE UPPER RESPIRATORY INFECTION: Primary | ICD-10-CM

## 2019-04-03 PROCEDURE — 99213 OFFICE O/P EST LOW 20 MIN: CPT | Performed by: NURSE PRACTITIONER

## 2019-04-03 PROCEDURE — 1036F TOBACCO NON-USER: CPT | Performed by: NURSE PRACTITIONER

## 2019-04-03 PROCEDURE — 3008F BODY MASS INDEX DOCD: CPT | Performed by: NURSE PRACTITIONER

## 2019-04-03 RX ORDER — DOXYCYCLINE HYCLATE 100 MG/1
100 CAPSULE ORAL EVERY 12 HOURS SCHEDULED
Qty: 20 CAPSULE | Refills: 0 | Status: SHIPPED | OUTPATIENT
Start: 2019-04-03 | End: 2019-04-13

## 2019-04-16 ENCOUNTER — TELEPHONE (OUTPATIENT)
Dept: GASTROENTEROLOGY | Facility: CLINIC | Age: 54
End: 2019-04-16

## 2019-04-16 PROBLEM — Z12.11 SPECIAL SCREENING FOR MALIGNANT NEOPLASMS, COLON: Status: ACTIVE | Noted: 2019-04-16

## 2019-05-07 ENCOUNTER — ANESTHESIA (OUTPATIENT)
Dept: PERIOP | Facility: HOSPITAL | Age: 54
End: 2019-05-07
Payer: COMMERCIAL

## 2019-05-07 ENCOUNTER — ANESTHESIA EVENT (OUTPATIENT)
Dept: PERIOP | Facility: HOSPITAL | Age: 54
End: 2019-05-07
Payer: COMMERCIAL

## 2019-05-07 ENCOUNTER — HOSPITAL ENCOUNTER (OUTPATIENT)
Facility: HOSPITAL | Age: 54
Setting detail: OUTPATIENT SURGERY
Discharge: HOME | End: 2019-05-07
Attending: INTERNAL MEDICINE | Admitting: INTERNAL MEDICINE
Payer: COMMERCIAL

## 2019-05-07 VITALS
RESPIRATION RATE: 20 BRPM | DIASTOLIC BLOOD PRESSURE: 68 MMHG | HEART RATE: 75 BPM | SYSTOLIC BLOOD PRESSURE: 114 MMHG | TEMPERATURE: 98 F | OXYGEN SATURATION: 96 %

## 2019-05-07 PROCEDURE — NC001 PR NO CHARGE: Performed by: INTERNAL MEDICINE

## 2019-05-07 PROCEDURE — G0121 COLON CA SCRN NOT HI RSK IND: HCPCS | Performed by: INTERNAL MEDICINE

## 2019-05-07 RX ORDER — SODIUM CHLORIDE, SODIUM LACTATE, POTASSIUM CHLORIDE, CALCIUM CHLORIDE 600; 310; 30; 20 MG/100ML; MG/100ML; MG/100ML; MG/100ML
INJECTION, SOLUTION INTRAVENOUS CONTINUOUS PRN
Status: DISCONTINUED | OUTPATIENT
Start: 2019-05-07 | End: 2019-05-07 | Stop reason: SURG

## 2019-05-07 RX ORDER — LIDOCAINE HYDROCHLORIDE 10 MG/ML
INJECTION, SOLUTION INFILTRATION; PERINEURAL AS NEEDED
Status: DISCONTINUED | OUTPATIENT
Start: 2019-05-07 | End: 2019-05-07 | Stop reason: SURG

## 2019-05-07 RX ORDER — PROPOFOL 10 MG/ML
INJECTION, EMULSION INTRAVENOUS AS NEEDED
Status: DISCONTINUED | OUTPATIENT
Start: 2019-05-07 | End: 2019-05-07 | Stop reason: SURG

## 2019-05-07 RX ADMIN — LIDOCAINE HYDROCHLORIDE 50 MG: 10 INJECTION, SOLUTION INFILTRATION; PERINEURAL at 11:23

## 2019-05-07 RX ADMIN — PROPOFOL 30 MG: 10 INJECTION, EMULSION INTRAVENOUS at 11:35

## 2019-05-07 RX ADMIN — PROPOFOL 40 MG: 10 INJECTION, EMULSION INTRAVENOUS at 11:26

## 2019-05-07 RX ADMIN — SODIUM CHLORIDE, SODIUM LACTATE, POTASSIUM CHLORIDE, AND CALCIUM CHLORIDE: .6; .31; .03; .02 INJECTION, SOLUTION INTRAVENOUS at 11:14

## 2019-05-07 RX ADMIN — PROPOFOL 100 MG: 10 INJECTION, EMULSION INTRAVENOUS at 11:23

## 2019-05-07 RX ADMIN — PROPOFOL 40 MG: 10 INJECTION, EMULSION INTRAVENOUS at 11:31

## 2019-05-07 RX ADMIN — PROPOFOL 40 MG: 10 INJECTION, EMULSION INTRAVENOUS at 11:28

## 2019-07-10 ENCOUNTER — TELEPHONE (OUTPATIENT)
Dept: FAMILY MEDICINE CLINIC | Facility: CLINIC | Age: 54
End: 2019-07-10

## 2019-07-10 DIAGNOSIS — Z12.31 SCREENING MAMMOGRAM, ENCOUNTER FOR: Primary | ICD-10-CM

## 2019-07-10 NOTE — TELEPHONE ENCOUNTER
----- Message from Maryjo Lobato sent at 7/10/2019 11:27 AM EDT -----  Regarding: Prescription Question  Contact: [phone]  Requesting a script for a routine (yearly) mammogram please  Thank you!

## 2019-07-28 ENCOUNTER — ANESTHESIA EVENT (OUTPATIENT)
Dept: PERIOP | Facility: HOSPITAL | Age: 54
DRG: 470 | End: 2019-07-28
Payer: COMMERCIAL

## 2019-07-28 RX ORDER — SODIUM CHLORIDE 9 MG/ML
125 INJECTION, SOLUTION INTRAVENOUS CONTINUOUS
Status: CANCELLED | OUTPATIENT
Start: 2019-08-16

## 2019-07-29 ENCOUNTER — HOSPITAL ENCOUNTER (OUTPATIENT)
Dept: NON INVASIVE DIAGNOSTICS | Facility: HOSPITAL | Age: 54
Discharge: HOME | End: 2019-07-29
Attending: ORTHOPAEDIC SURGERY
Payer: COMMERCIAL

## 2019-07-29 ENCOUNTER — APPOINTMENT (OUTPATIENT)
Dept: LAB | Facility: HOSPITAL | Age: 54
End: 2019-07-29
Attending: ORTHOPAEDIC SURGERY
Payer: COMMERCIAL

## 2019-07-29 ENCOUNTER — APPOINTMENT (OUTPATIENT)
Dept: PREADMISSION TESTING | Facility: HOSPITAL | Age: 54
End: 2019-07-29
Payer: COMMERCIAL

## 2019-07-29 ENCOUNTER — HOSPITAL ENCOUNTER (OUTPATIENT)
Dept: RADIOLOGY | Facility: HOSPITAL | Age: 54
Discharge: HOME | End: 2019-07-29
Attending: ORTHOPAEDIC SURGERY
Payer: COMMERCIAL

## 2019-07-29 ENCOUNTER — TRANSCRIBE ORDERS (OUTPATIENT)
Dept: ADMINISTRATIVE | Facility: HOSPITAL | Age: 54
End: 2019-07-29

## 2019-07-29 DIAGNOSIS — Z01.818 OTHER SPECIFIED PRE-OPERATIVE EXAMINATION: ICD-10-CM

## 2019-07-29 DIAGNOSIS — M16.11 PRIMARY OSTEOARTHRITIS OF RIGHT HIP: ICD-10-CM

## 2019-07-29 DIAGNOSIS — M16.11 PRIMARY OSTEOARTHRITIS OF RIGHT HIP: Primary | ICD-10-CM

## 2019-07-29 LAB
ABO GROUP BLD: NORMAL
ALBUMIN SERPL BCP-MCNC: 3.7 G/DL (ref 3.5–5)
ALP SERPL-CCNC: 102 U/L (ref 46–116)
ALT SERPL W P-5'-P-CCNC: 20 U/L (ref 12–78)
ANION GAP SERPL CALCULATED.3IONS-SCNC: 6 MMOL/L (ref 4–13)
AST SERPL W P-5'-P-CCNC: 19 U/L (ref 5–45)
ATRIAL RATE: 57 BPM
BASOPHILS # BLD AUTO: 0.04 THOUSANDS/ΜL (ref 0–0.1)
BASOPHILS NFR BLD AUTO: 1 % (ref 0–1)
BILIRUB SERPL-MCNC: 0.49 MG/DL (ref 0.2–1)
BILIRUB UR QL STRIP: NEGATIVE
BILIRUB UR QL STRIP: NEGATIVE
BLD GP AB SCN SERPL QL: NEGATIVE
BUN SERPL-MCNC: 16 MG/DL (ref 5–25)
CALCIUM SERPL-MCNC: 9.3 MG/DL (ref 8.3–10.1)
CHLORIDE SERPL-SCNC: 106 MMOL/L (ref 100–108)
CLARITY UR: CLEAR
CLARITY UR: CLEAR
CO2 SERPL-SCNC: 29 MMOL/L (ref 21–32)
COLOR UR: YELLOW
COLOR UR: YELLOW
CREAT SERPL-MCNC: 0.88 MG/DL (ref 0.6–1.3)
EOSINOPHIL # BLD AUTO: 0.09 THOUSAND/ΜL (ref 0–0.61)
EOSINOPHIL NFR BLD AUTO: 2 % (ref 0–6)
ERYTHROCYTE [DISTWIDTH] IN BLOOD BY AUTOMATED COUNT: 13.2 % (ref 11.6–15.1)
GFR SERPL CREATININE-BSD FRML MDRD: 74 ML/MIN/1.73SQ M
GLUCOSE SERPL-MCNC: 91 MG/DL (ref 65–140)
GLUCOSE UR STRIP-MCNC: NEGATIVE MG/DL
GLUCOSE UR STRIP-MCNC: NEGATIVE MG/DL
HCT VFR BLD AUTO: 38.6 % (ref 34.8–46.1)
HGB BLD-MCNC: 12.3 G/DL (ref 11.5–15.4)
HGB UR QL STRIP.AUTO: NEGATIVE
HGB UR QL STRIP.AUTO: NEGATIVE
IMM GRANULOCYTES # BLD AUTO: 0.01 THOUSAND/UL (ref 0–0.2)
IMM GRANULOCYTES NFR BLD AUTO: 0 % (ref 0–2)
KETONES UR STRIP-MCNC: NEGATIVE MG/DL
KETONES UR STRIP-MCNC: NEGATIVE MG/DL
LEUKOCYTE ESTERASE UR QL STRIP: NEGATIVE
LEUKOCYTE ESTERASE UR QL STRIP: NEGATIVE
LYMPHOCYTES # BLD AUTO: 1.69 THOUSANDS/ΜL (ref 0.6–4.47)
LYMPHOCYTES NFR BLD AUTO: 40 % (ref 14–44)
MCH RBC QN AUTO: 30.3 PG (ref 26.8–34.3)
MCHC RBC AUTO-ENTMCNC: 31.9 G/DL (ref 31.4–37.4)
MCV RBC AUTO: 95 FL (ref 82–98)
MONOCYTES # BLD AUTO: 0.45 THOUSAND/ΜL (ref 0.17–1.22)
MONOCYTES NFR BLD AUTO: 11 % (ref 4–12)
NEUTROPHILS # BLD AUTO: 1.95 THOUSANDS/ΜL (ref 1.85–7.62)
NEUTS SEG NFR BLD AUTO: 46 % (ref 43–75)
NITRITE UR QL STRIP: NEGATIVE
NITRITE UR QL STRIP: NEGATIVE
NRBC BLD AUTO-RTO: 0 /100 WBCS
P AXIS: 52 DEGREES
PH UR STRIP.AUTO: 7 [PH]
PH UR STRIP.AUTO: 7 [PH]
PLATELET # BLD AUTO: 254 THOUSANDS/UL (ref 149–390)
PMV BLD AUTO: 9.8 FL (ref 8.9–12.7)
POTASSIUM SERPL-SCNC: 4.6 MMOL/L (ref 3.5–5.3)
PR INTERVAL: 174 MS
PROT SERPL-MCNC: 7.4 G/DL (ref 6.4–8.2)
PROT UR STRIP-MCNC: NEGATIVE MG/DL
PROT UR STRIP-MCNC: NEGATIVE MG/DL
QRS AXIS: 50 DEGREES
QRSD INTERVAL: 88 MS
QT INTERVAL: 426 MS
QTC INTERVAL: 414 MS
RBC # BLD AUTO: 4.06 MILLION/UL (ref 3.81–5.12)
RH BLD: POSITIVE
SODIUM SERPL-SCNC: 141 MMOL/L (ref 136–145)
SP GR UR STRIP.AUTO: 1.02 (ref 1–1.03)
SP GR UR STRIP.AUTO: 1.02 (ref 1–1.03)
SPECIMEN EXPIRATION DATE: NORMAL
T WAVE AXIS: 43 DEGREES
UROBILINOGEN UR QL STRIP.AUTO: 0.2 E.U./DL
UROBILINOGEN UR QL STRIP.AUTO: 0.2 E.U./DL
VENTRICULAR RATE: 57 BPM
WBC # BLD AUTO: 4.23 THOUSAND/UL (ref 4.31–10.16)

## 2019-07-29 PROCEDURE — 85025 COMPLETE CBC W/AUTO DIFF WBC: CPT

## 2019-07-29 PROCEDURE — 93010 ELECTROCARDIOGRAM REPORT: CPT | Performed by: INTERNAL MEDICINE

## 2019-07-29 PROCEDURE — 86901 BLOOD TYPING SEROLOGIC RH(D): CPT

## 2019-07-29 PROCEDURE — 93005 ELECTROCARDIOGRAM TRACING: CPT

## 2019-07-29 PROCEDURE — 80053 COMPREHEN METABOLIC PANEL: CPT

## 2019-07-29 PROCEDURE — 86900 BLOOD TYPING SEROLOGIC ABO: CPT

## 2019-07-29 PROCEDURE — 81003 URINALYSIS AUTO W/O SCOPE: CPT | Performed by: ORTHOPAEDIC SURGERY

## 2019-07-29 PROCEDURE — 71046 X-RAY EXAM CHEST 2 VIEWS: CPT

## 2019-07-29 PROCEDURE — 36415 COLL VENOUS BLD VENIPUNCTURE: CPT

## 2019-07-29 PROCEDURE — 86850 RBC ANTIBODY SCREEN: CPT

## 2019-07-29 NOTE — ANESTHESIA PREPROCEDURE EVALUATION
Review of Systems/Medical History  Patient summary reviewed  Chart reviewed  No history of anesthetic complications     Cardiovascular  Negative cardio ROS    Pulmonary  Negative pulmonary ROS        GI/Hepatic  Negative GI/hepatic ROS          Negative  ROS        Endo/Other  Negative endo/other ROS      GYN  Negative gynecology ROS          Hematology  Negative hematology ROS      Musculoskeletal    Arthritis     Neurology  Negative neurology ROS      Psychology   Negative psychology ROS              Physical Exam    Airway    Mallampati score: II  TM Distance: >3 FB  Neck ROM: full     Dental   No notable dental hx     Cardiovascular  Comment: Negative ROS, Rhythm: regular, Rate: normal, Cardiovascular exam normal    Pulmonary  Pulmonary exam normal Breath sounds clear to auscultation,     Other Findings        Anesthesia Plan  ASA Score- 2     Anesthesia Type- spinal with ASA Monitors  Additional Monitors:   Airway Plan:         Plan Factors-Patient not instructed to abstain from smoking on day of procedure       Induction- intravenous  Postoperative Plan-     Informed Consent- Anesthetic plan and risks discussed with patient

## 2019-07-29 NOTE — PRE-PROCEDURE INSTRUCTIONS
No outpatient medications have been marked as taking for the 8/16/19 encounter [facility] Encounter)  [facility] preop instructions given to pt and reviewed  Pt given Chlorhexidine  Pt given incentive spirometer with instruction

## 2019-08-14 ENCOUNTER — OFFICE VISIT (OUTPATIENT)
Dept: FAMILY MEDICINE CLINIC | Facility: CLINIC | Age: 54
End: 2019-08-14
Payer: COMMERCIAL

## 2019-08-14 VITALS
RESPIRATION RATE: 16 BRPM | WEIGHT: 174 LBS | BODY MASS INDEX: 30.83 KG/M2 | DIASTOLIC BLOOD PRESSURE: 84 MMHG | SYSTOLIC BLOOD PRESSURE: 136 MMHG | TEMPERATURE: 97.7 F | HEIGHT: 63 IN | HEART RATE: 64 BPM

## 2019-08-14 DIAGNOSIS — M16.11 OSTEOARTHRITIS OF RIGHT HIP, UNSPECIFIED OSTEOARTHRITIS TYPE: ICD-10-CM

## 2019-08-14 DIAGNOSIS — Z01.818 PREOP EXAMINATION: Primary | ICD-10-CM

## 2019-08-14 PROCEDURE — 99242 OFF/OP CONSLTJ NEW/EST SF 20: CPT | Performed by: NURSE PRACTITIONER

## 2019-08-14 NOTE — PROGRESS NOTES
FAMILY PRACTICE PRE-OPERATIVE EVALUATION  [facility]    NAME: Joanna Ceron  AGE: 53 y o  SEX: female  : 1965     DATE: 2019    Family Practice Pre-Operative Evaluation      Chief Complaint: Pre-operative Evaluation     Surgery: ARTHROPLASTY HIP TOTAL ANTERIOR (Right Hip)  Anticipated Date of Surgery: 2019  Surgeon: Dr Anne Brennan     History of Present Illness:     HPI    Anesthesia:  spinal  Bleeding Risk: no recent abnormal bleeding  Current Anti-platelet/anticoagulation medication: None    Assessment of Cardiac Risk:  · Denies unstable or severe angina or MI in the last 6 weeks or history of stent placement in the last year   · Denies decompensated heart failure (e g  New onset heart failure, NYHA functional class IV heart failure, or worsening existing heart failure)  · Denies significant arrhythmias such as high grade AV block, symptomatic ventricular arrhythmia, newly recognized ventricular tachycardia, supraventricular tachycardia with resting heart rate >100, or symptomatic bradycardia  · Denies severe heart valve disease including aortic stenosis or symptomatic mitral stenosis     Exercise Capacity:  · Able to walk 4 blocks without symptoms?: Yes  · Able to walk 2 flights without symptoms?: Yes    Prior Anesthesia Reactions: No     Personal history of venous thromboembolic disease? No    History of steroid use for >2 weeks within last year? No         Review of Systems:     Review of Systems   Constitutional: Negative  HENT: Negative  Eyes: Negative  Respiratory: Negative  Cardiovascular: Negative  Gastrointestinal: Negative  Endocrine: Negative  Genitourinary: Negative  Musculoskeletal: Positive for arthralgias  Skin: Negative  Allergic/Immunologic: Negative  Neurological: Negative  Hematological: Negative  Psychiatric/Behavioral: Negative          Current Problem List:     Patient Active Problem List   Diagnosis  Osteoarthritis of right hip    Carpal tunnel syndrome, right    Special screening for malignant neoplasms, colon       Allergies: Allergies   Allergen Reactions    Amoxicillin Rash     Reaction Date: [id number];     Azithromycin Rash     Reaction Date: [id number];        Current Medications:       Current Outpatient Medications:     mupirocin (BACTROBAN) 2 % ointment, APPLY SMALL AMOUNT TO NOSTRILS TWICE DAILY STARTING 5 DAYS PRIOR TO SURGERY, Disp: , Rfl: 0    mupirocin (BACTROBAN) 2 % ointment, mupirocin 2 % topical ointment  APPLY SMALL AMOUNT TO NOSTRILS TWICE DAILY STARTING 5 DAYS PRIOR TO SURGERY, Disp: , Rfl:     Past Medical History:       Past Medical History:   Diagnosis Date    Arthritis     hands    Contact lens overwear of both eyes     Wears glasses         Past Surgical History:   Procedure Laterality Date    BONE MARROW ASPIRATION      CHOLECYSTECTOMY      HAND SURGERY Left     thumb    HIP SURGERY Left 2014    CO COLONOSCOPY FLX DX W/COLLJ SPEC WHEN PFRMD N/A 5/7/2019    Procedure: COLONOSCOPY;  Surgeon: Alcides Gonzalez MD;  Location: [facility];   Service: Gastroenterology    TONSILLECTOMY      TYMPANOSTOMY TUBE PLACEMENT          Family History   Problem Relation Age of Onset    Breast cancer Mother     Anxiety disorder Mother     Cancer Father         mesothelioma     Other Brother         possible liver mass    Diabetes Paternal Grandmother         Social History     Socioeconomic History    Marital status: Single     Spouse name: Not on file    Number of children: Not on file    Years of education: Not on file    Highest education level: Not on file   Occupational History    Not on file   Social Needs    Financial resource strain: Not on file    Food insecurity:     Worry: Not on file     Inability: Not on file    Transportation needs:     Medical: Not on file     Non-medical: Not on file   Tobacco Use    Smoking status: Never Smoker    Smokeless tobacco: Never Used   Substance and Sexual Activity    Alcohol use: Yes     Frequency: Monthly or less    Drug use: No    Sexual activity: Not on file   Lifestyle    Physical activity:     Days per week: Not on file     Minutes per session: Not on file    Stress: Not on file   Relationships    Social connections:     Talks on phone: Not on file     Gets together: Not on file     Attends Cheondoism service: Not on file     Active member of club or organization: Not on file     Attends meetings of clubs or organizations: Not on file     Relationship status: Not on file    Intimate partner violence:     Fear of current or ex partner: Not on file     Emotionally abused: Not on file     Physically abused: Not on file     Forced sexual activity: Not on file   Other Topics Concern    Not on file   Social History Narrative    Not on file        Physical Exam:     /84   Pulse 64   Temp 97 7 °F (36 5 °C)   Resp 16   Ht 5' 3" (1 6 m)   Wt 78 9 kg (174 lb)   BMI 30 82 kg/m²     Physical Exam   Constitutional: She is oriented to person, place, and time  She appears well-developed and well-nourished  HENT:   Head: Normocephalic  Right Ear: External ear normal    Left Ear: External ear normal    Mouth/Throat: Oropharynx is clear and moist    Eyes: Conjunctivae are normal    Neck: Normal range of motion  Neck supple  Cardiovascular: Normal rate, regular rhythm and normal heart sounds  No murmur heard  Pulmonary/Chest: Effort normal and breath sounds normal    Abdominal: Soft  Bowel sounds are normal  She exhibits no distension and no mass  There is no tenderness  There is no rebound and no guarding  Musculoskeletal: She exhibits no edema  Neurological: She is alert and oriented to person, place, and time  Skin: Skin is warm and dry  No rash noted  Psychiatric: She has a normal mood and affect   Her behavior is normal  Judgment and thought content normal         Data:     Pre-operative work-up    Laboratory Results: I have personally reviewed the pertinent laboratory results/reports      EKG: I have personally reviewed pertinent reports  EKG read by cardiologist  SB otherwise normal ECG and in office rate is 64       Recent Results (from the past 672 hour(s))   UA w Reflex to Microscopic w Reflex to Culture    Collection Time: 07/29/19 11:15 AM   Result Value Ref Range    Color, UA Yellow     Clarity, UA Clear     Specific Gravity, UA 1 020 1 003 - 1 030    pH, UA 7 0 4 5, 5 0, 5 5, 6 0, 6 5, 7 0, 7 5, 8 0    Leukocytes, UA Negative Negative    Nitrite, UA Negative Negative    Protein, UA Negative Negative mg/dl    Glucose, UA Negative Negative mg/dl    Ketones, UA Negative Negative mg/dl    Urobilinogen, UA 0 2 0 2, 1 0 E U /dl E U /dl    Bilirubin, UA Negative Negative    Blood, UA Negative Negative   UA (URINE) with reflex to Microscopic    Collection Time: 07/29/19 11:15 AM   Result Value Ref Range    Color, UA Yellow     Clarity, UA Clear     Specific Gravity, UA 1 020 1 003 - 1 030    pH, UA 7 0 4 5, 5 0, 5 5, 6 0, 6 5, 7 0, 7 5, 8 0    Leukocytes, UA Negative Negative    Nitrite, UA Negative Negative    Protein, UA Negative Negative mg/dl    Glucose, UA Negative Negative mg/dl    Ketones, UA Negative Negative mg/dl    Urobilinogen, UA 0 2 0 2, 1 0 E U /dl E U /dl    Bilirubin, UA Negative Negative    Blood, UA Negative Negative   CBC and differential    Collection Time: 07/29/19 11:15 AM   Result Value Ref Range    WBC 4 23 (L) 4 31 - 10 16 Thousand/uL    RBC 4 06 3 81 - 5 12 Million/uL    Hemoglobin 12 3 11 5 - 15 4 g/dL    Hematocrit 38 6 34 8 - 46 1 %    MCV 95 82 - 98 fL    MCH 30 3 26 8 - 34 3 pg    MCHC 31 9 31 4 - 37 4 g/dL    RDW 13 2 11 6 - 15 1 %    MPV 9 8 8 9 - 12 7 fL    Platelets 212 233 - 694 Thousands/uL    nRBC 0 /100 WBCs    Neutrophils Relative 46 43 - 75 %    Immat GRANS % 0 0 - 2 %    Lymphocytes Relative 40 14 - 44 %    Monocytes Relative 11 4 - 12 %    Eosinophils Relative 2 0 - 6 %    Basophils Relative 1 0 - 1 %    Neutrophils Absolute 1 95 1 85 - 7 62 Thousands/µL    Immature Grans Absolute 0 01 0 00 - 0 20 Thousand/uL    Lymphocytes Absolute 1 69 0 60 - 4 47 Thousands/µL    Monocytes Absolute 0 45 0 17 - 1 22 Thousand/µL    Eosinophils Absolute 0 09 0 00 - 0 61 Thousand/µL    Basophils Absolute 0 04 0 00 - 0 10 Thousands/µL   Comprehensive metabolic panel    Collection Time: 07/29/19 11:15 AM   Result Value Ref Range    Sodium 141 136 - 145 mmol/L    Potassium 4 6 3 5 - 5 3 mmol/L    Chloride 106 100 - 108 mmol/L    CO2 29 21 - 32 mmol/L    ANION GAP 6 4 - 13 mmol/L    BUN 16 5 - 25 mg/dL    Creatinine 0 88 0 60 - 1 30 mg/dL    Glucose 91 65 - 140 mg/dL    Calcium 9 3 8 3 - 10 1 mg/dL    AST 19 5 - 45 U/L    ALT 20 12 - 78 U/L    Alkaline Phosphatase 102 46 - 116 U/L    Total Protein 7 4 6 4 - 8 2 g/dL    Albumin 3 7 3 5 - 5 0 g/dL    Total Bilirubin 0 49 0 20 - 1 00 mg/dL    eGFR 74 ml/min/1 73sq m   Type and screen    Collection Time: 07/29/19 11:15 AM   Result Value Ref Range    ABO Grouping O     Rh Factor Positive     Antibody Screen Negative     Specimen Expiration Date [id number]    ECG 12 lead    Collection Time: 07/29/19 11:25 AM   Result Value Ref Range    Ventricular Rate 57 BPM    Atrial Rate 57 BPM    ID Interval 174 ms    QRSD Interval 88 ms    QT Interval 426 ms    QTC Interval 414 ms    P Axis 52 degrees    QRS Axis 50 degrees    T Wave Axis 43 degrees           Assessment & Recommendations:     Problem List Items Addressed This Visit        Musculoskeletal and Integument    Osteoarthritis of right hip      Other Visit Diagnoses     Preop examination    -  Primary          Pre-Op Evaluation Assessment  53 y o  female with planned surgery: ARTHROPLASTY HIP TOTAL ANTERIOR (Right Hip)  Known risk factors for perioperative complications: None  Current medications which may produce withdrawal symptoms if withheld perioperatively: None      Pre-Op Evaluation Plan  1  Further preoperative workup as follows:   - None; no further preoperative work-up is required    2  Medication Management/Recommendations:   - Not applicable, not on any medications    3  Prophylaxis for cardiac events with perioperative beta-blockers: should be considered, specific regimen per anesthesia  4  Patient requires further consultation with: None    MARIA ISABEL Risk:  GFR:   eGFR   Date Value Ref Range Status   07/29/2019 74 ml/min/1 73sq m Final         For PCP:  If GFR>60, Hold ACE/ARB/Diuretic on the day of surgery, and NSAIDS 10 days before  If GFR<45, Consider PRE and POST op Nephrology Consult  If 46 <GFR> 59 : Has Patient had MARIA ISABEL in last 6 Months? no   If YES: Preop Nephrology consult   If No:  Minda 26 Nephrology consult  Clearance  Patient is CLEARED for surgery without any additional cardiac testing  Reevaluation needed, if patient should present with symptoms prior to surgery/procedure      Connie Phan, [facility] [address] [address]  Phone#  [phone]  Fax#  [phone]

## 2019-08-16 ENCOUNTER — ANESTHESIA (OUTPATIENT)
Dept: PERIOP | Facility: HOSPITAL | Age: 54
DRG: 470 | End: 2019-08-16
Payer: COMMERCIAL

## 2019-08-16 ENCOUNTER — HOSPITAL ENCOUNTER (INPATIENT)
Facility: HOSPITAL | Age: 54
LOS: 2 days | Discharge: HOME HEALTH | DRG: 470 | End: 2019-08-18
Attending: ORTHOPAEDIC SURGERY | Admitting: ORTHOPAEDIC SURGERY
Payer: COMMERCIAL

## 2019-08-16 ENCOUNTER — APPOINTMENT (OUTPATIENT)
Dept: RADIOLOGY | Facility: HOSPITAL | Age: 54
DRG: 470 | End: 2019-08-16
Payer: COMMERCIAL

## 2019-08-16 DIAGNOSIS — M16.11 PRIMARY OSTEOARTHRITIS OF RIGHT HIP: Primary | ICD-10-CM

## 2019-08-16 LAB
ABO GROUP BLD BPU: NORMAL
BPU ID: NORMAL
CROSSMATCH: NORMAL
ERYTHROCYTE [DISTWIDTH] IN BLOOD BY AUTOMATED COUNT: 13 % (ref 11.6–15.1)
HCT VFR BLD AUTO: 26.1 % (ref 34.8–46.1)
HGB BLD-MCNC: 8.2 G/DL (ref 11.5–15.4)
MCH RBC QN AUTO: 30.4 PG (ref 26.8–34.3)
MCHC RBC AUTO-ENTMCNC: 31.4 G/DL (ref 31.4–37.4)
MCV RBC AUTO: 97 FL (ref 82–98)
PLATELET # BLD AUTO: 182 THOUSANDS/UL (ref 149–390)
PMV BLD AUTO: 9.9 FL (ref 8.9–12.7)
RBC # BLD AUTO: 2.7 MILLION/UL (ref 3.81–5.12)
UNIT DISPENSE STATUS: NORMAL
UNIT PRODUCT CODE: NORMAL
UNIT RH: NORMAL
WBC # BLD AUTO: 5.71 THOUSAND/UL (ref 4.31–10.16)

## 2019-08-16 PROCEDURE — 73501 X-RAY EXAM HIP UNI 1 VIEW: CPT

## 2019-08-16 PROCEDURE — G8978 MOBILITY CURRENT STATUS: HCPCS | Performed by: PHYSICAL THERAPIST

## 2019-08-16 PROCEDURE — C1713 ANCHOR/SCREW BN/BN,TIS/BN: HCPCS | Performed by: ORTHOPAEDIC SURGERY

## 2019-08-16 PROCEDURE — C1776 JOINT DEVICE (IMPLANTABLE): HCPCS | Performed by: ORTHOPAEDIC SURGERY

## 2019-08-16 PROCEDURE — 0SR90JA REPLACEMENT OF RIGHT HIP JOINT WITH SYNTHETIC SUBSTITUTE, UNCEMENTED, OPEN APPROACH: ICD-10-PCS | Performed by: ORTHOPAEDIC SURGERY

## 2019-08-16 PROCEDURE — 86923 COMPATIBILITY TEST ELECTRIC: CPT

## 2019-08-16 PROCEDURE — 97530 THERAPEUTIC ACTIVITIES: CPT | Performed by: PHYSICAL THERAPIST

## 2019-08-16 PROCEDURE — G8979 MOBILITY GOAL STATUS: HCPCS | Performed by: PHYSICAL THERAPIST

## 2019-08-16 PROCEDURE — 85027 COMPLETE CBC AUTOMATED: CPT | Performed by: ANESTHESIOLOGY

## 2019-08-16 PROCEDURE — 97163 PT EVAL HIGH COMPLEX 45 MIN: CPT | Performed by: PHYSICAL THERAPIST

## 2019-08-16 DEVICE — BIOLOX DELTA CERAMIC FEMORAL HEAD +1.5 36MM DIA 12/14 TAPER
Type: IMPLANTABLE DEVICE | Site: HIP | Status: FUNCTIONAL
Brand: BIOLOX DELTA

## 2019-08-16 DEVICE — PINNACLE HIP SOLUTIONS ALTRX POLYETHYLENE ACETABULAR LINER NEUTRAL 36MM ID 58MM OD
Type: IMPLANTABLE DEVICE | Site: HIP | Status: FUNCTIONAL
Brand: PINNACLE ALTRX

## 2019-08-16 DEVICE — PINNACLE CANCELLOUS BONE SCREW 6.5MM X 20MM
Type: IMPLANTABLE DEVICE | Site: HIP | Status: FUNCTIONAL
Brand: PINNACLE

## 2019-08-16 DEVICE — CORAIL HIP SYSTEM CEMENTLESS FEMORAL STEM 12/14 AMT 135 DEGREES KA SIZE 8 HA COATED STANDARD COLLAR
Type: IMPLANTABLE DEVICE | Site: HIP | Status: FUNCTIONAL
Brand: CORAIL

## 2019-08-16 DEVICE — PINNACLE POROCOAT ACETABULAR SHELL SECTOR II 58MM OD
Type: IMPLANTABLE DEVICE | Site: HIP | Status: FUNCTIONAL
Brand: PINNACLE POROCOAT

## 2019-08-16 RX ORDER — CELECOXIB 200 MG/1
200 CAPSULE ORAL DAILY
Status: DISCONTINUED | OUTPATIENT
Start: 2019-08-16 | End: 2019-08-18 | Stop reason: HOSPADM

## 2019-08-16 RX ORDER — TRANEXAMIC ACID 100 MG/ML
INJECTION, SOLUTION INTRAVENOUS AS NEEDED
Status: DISCONTINUED | OUTPATIENT
Start: 2019-08-16 | End: 2019-08-16 | Stop reason: SURG

## 2019-08-16 RX ORDER — ASPIRIN 81 MG/1
81 TABLET ORAL 2 TIMES DAILY
Status: DISCONTINUED | OUTPATIENT
Start: 2019-08-16 | End: 2019-08-18 | Stop reason: HOSPADM

## 2019-08-16 RX ORDER — OXYCODONE HYDROCHLORIDE 5 MG/1
5 TABLET ORAL EVERY 4 HOURS PRN
Status: DISCONTINUED | OUTPATIENT
Start: 2019-08-16 | End: 2019-08-18 | Stop reason: HOSPADM

## 2019-08-16 RX ORDER — HYDROMORPHONE HCL/PF 1 MG/ML
0.5 SYRINGE (ML) INJECTION
Status: DISCONTINUED | OUTPATIENT
Start: 2019-08-16 | End: 2019-08-16 | Stop reason: HOSPADM

## 2019-08-16 RX ORDER — VANCOMYCIN HYDROCHLORIDE 1 G/200ML
1000 INJECTION, SOLUTION INTRAVENOUS
Status: DISCONTINUED | OUTPATIENT
Start: 2019-08-16 | End: 2019-08-16 | Stop reason: HOSPADM

## 2019-08-16 RX ORDER — ACETAMINOPHEN 325 MG/1
650 TABLET ORAL EVERY 6 HOURS PRN
Status: DISCONTINUED | OUTPATIENT
Start: 2019-08-16 | End: 2019-08-17

## 2019-08-16 RX ORDER — MAGNESIUM HYDROXIDE 1200 MG/15ML
LIQUID ORAL AS NEEDED
Status: DISCONTINUED | OUTPATIENT
Start: 2019-08-16 | End: 2019-08-16 | Stop reason: HOSPADM

## 2019-08-16 RX ORDER — SODIUM CHLORIDE 9 MG/ML
125 INJECTION, SOLUTION INTRAVENOUS CONTINUOUS
Status: DISCONTINUED | OUTPATIENT
Start: 2019-08-16 | End: 2019-08-18 | Stop reason: HOSPADM

## 2019-08-16 RX ORDER — METOCLOPRAMIDE HYDROCHLORIDE 5 MG/ML
10 INJECTION INTRAMUSCULAR; INTRAVENOUS ONCE AS NEEDED
Status: DISCONTINUED | OUTPATIENT
Start: 2019-08-16 | End: 2019-08-16 | Stop reason: HOSPADM

## 2019-08-16 RX ORDER — ONDANSETRON 2 MG/ML
4 INJECTION INTRAMUSCULAR; INTRAVENOUS EVERY 6 HOURS PRN
Status: DISCONTINUED | OUTPATIENT
Start: 2019-08-16 | End: 2019-08-18 | Stop reason: HOSPADM

## 2019-08-16 RX ORDER — FENTANYL CITRATE 50 UG/ML
INJECTION, SOLUTION INTRAMUSCULAR; INTRAVENOUS AS NEEDED
Status: DISCONTINUED | OUTPATIENT
Start: 2019-08-16 | End: 2019-08-16 | Stop reason: SURG

## 2019-08-16 RX ORDER — PANTOPRAZOLE SODIUM 40 MG/1
40 TABLET, DELAYED RELEASE ORAL DAILY
Status: DISCONTINUED | OUTPATIENT
Start: 2019-08-16 | End: 2019-08-18 | Stop reason: HOSPADM

## 2019-08-16 RX ORDER — HYDROMORPHONE HCL/PF 1 MG/ML
0.2 SYRINGE (ML) INJECTION
Status: DISCONTINUED | OUTPATIENT
Start: 2019-08-16 | End: 2019-08-18 | Stop reason: HOSPADM

## 2019-08-16 RX ORDER — KETOROLAC TROMETHAMINE 30 MG/ML
30 INJECTION, SOLUTION INTRAMUSCULAR; INTRAVENOUS EVERY 6 HOURS PRN
Status: DISCONTINUED | OUTPATIENT
Start: 2019-08-16 | End: 2019-08-18 | Stop reason: HOSPADM

## 2019-08-16 RX ORDER — OXYCODONE HYDROCHLORIDE 10 MG/1
10 TABLET ORAL EVERY 4 HOURS PRN
Status: DISCONTINUED | OUTPATIENT
Start: 2019-08-16 | End: 2019-08-18 | Stop reason: HOSPADM

## 2019-08-16 RX ORDER — SODIUM CHLORIDE, SODIUM LACTATE, POTASSIUM CHLORIDE, CALCIUM CHLORIDE 600; 310; 30; 20 MG/100ML; MG/100ML; MG/100ML; MG/100ML
125 INJECTION, SOLUTION INTRAVENOUS CONTINUOUS
Status: DISCONTINUED | OUTPATIENT
Start: 2019-08-16 | End: 2019-08-18 | Stop reason: HOSPADM

## 2019-08-16 RX ORDER — PROPOFOL 10 MG/ML
INJECTION, EMULSION INTRAVENOUS CONTINUOUS PRN
Status: DISCONTINUED | OUTPATIENT
Start: 2019-08-16 | End: 2019-08-16 | Stop reason: SURG

## 2019-08-16 RX ORDER — FERROUS SULFATE 325(65) MG
325 TABLET ORAL
Status: DISCONTINUED | OUTPATIENT
Start: 2019-08-17 | End: 2019-08-17

## 2019-08-16 RX ORDER — BUPIVACAINE HYDROCHLORIDE 7.5 MG/ML
INJECTION, SOLUTION INTRASPINAL AS NEEDED
Status: DISCONTINUED | OUTPATIENT
Start: 2019-08-16 | End: 2019-08-16 | Stop reason: SURG

## 2019-08-16 RX ORDER — METHOCARBAMOL 500 MG/1
500 TABLET, FILM COATED ORAL EVERY 6 HOURS PRN
Status: DISCONTINUED | OUTPATIENT
Start: 2019-08-16 | End: 2019-08-18 | Stop reason: HOSPADM

## 2019-08-16 RX ORDER — DEXAMETHASONE SODIUM PHOSPHATE 4 MG/ML
INJECTION, SOLUTION INTRA-ARTICULAR; INTRALESIONAL; INTRAMUSCULAR; INTRAVENOUS; SOFT TISSUE AS NEEDED
Status: DISCONTINUED | OUTPATIENT
Start: 2019-08-16 | End: 2019-08-16 | Stop reason: SURG

## 2019-08-16 RX ORDER — ONDANSETRON 2 MG/ML
4 INJECTION INTRAMUSCULAR; INTRAVENOUS ONCE AS NEEDED
Status: DISCONTINUED | OUTPATIENT
Start: 2019-08-16 | End: 2019-08-16 | Stop reason: HOSPADM

## 2019-08-16 RX ORDER — SENNOSIDES 8.6 MG
1 TABLET ORAL DAILY
Status: DISCONTINUED | OUTPATIENT
Start: 2019-08-16 | End: 2019-08-18 | Stop reason: HOSPADM

## 2019-08-16 RX ORDER — ALBUMIN, HUMAN INJ 5% 5 %
SOLUTION INTRAVENOUS CONTINUOUS PRN
Status: DISCONTINUED | OUTPATIENT
Start: 2019-08-16 | End: 2019-08-16 | Stop reason: SURG

## 2019-08-16 RX ORDER — TRAMADOL HYDROCHLORIDE 50 MG/1
50 TABLET ORAL EVERY 6 HOURS PRN
Status: DISCONTINUED | OUTPATIENT
Start: 2019-08-16 | End: 2019-08-18 | Stop reason: HOSPADM

## 2019-08-16 RX ORDER — CEFAZOLIN SODIUM 1 G/50ML
1000 SOLUTION INTRAVENOUS EVERY 8 HOURS
Status: COMPLETED | OUTPATIENT
Start: 2019-08-16 | End: 2019-08-17

## 2019-08-16 RX ORDER — FENTANYL CITRATE/PF 50 MCG/ML
50 SYRINGE (ML) INJECTION
Status: DISCONTINUED | OUTPATIENT
Start: 2019-08-16 | End: 2019-08-16 | Stop reason: HOSPADM

## 2019-08-16 RX ORDER — CALCIUM CARBONATE 200(500)MG
1000 TABLET,CHEWABLE ORAL DAILY PRN
Status: DISCONTINUED | OUTPATIENT
Start: 2019-08-16 | End: 2019-08-18 | Stop reason: HOSPADM

## 2019-08-16 RX ORDER — SODIUM CHLORIDE 9 MG/ML
INJECTION, SOLUTION INTRAVENOUS AS NEEDED
Status: DISCONTINUED | OUTPATIENT
Start: 2019-08-16 | End: 2019-08-16 | Stop reason: HOSPADM

## 2019-08-16 RX ORDER — CEFAZOLIN SODIUM 2 G/50ML
2000 SOLUTION INTRAVENOUS
Status: DISCONTINUED | OUTPATIENT
Start: 2019-08-16 | End: 2019-08-16 | Stop reason: HOSPADM

## 2019-08-16 RX ORDER — ONDANSETRON 2 MG/ML
INJECTION INTRAMUSCULAR; INTRAVENOUS AS NEEDED
Status: DISCONTINUED | OUTPATIENT
Start: 2019-08-16 | End: 2019-08-16 | Stop reason: SURG

## 2019-08-16 RX ORDER — MIDAZOLAM HYDROCHLORIDE 1 MG/ML
INJECTION INTRAMUSCULAR; INTRAVENOUS AS NEEDED
Status: DISCONTINUED | OUTPATIENT
Start: 2019-08-16 | End: 2019-08-16 | Stop reason: SURG

## 2019-08-16 RX ORDER — ASCORBIC ACID 500 MG
250 TABLET ORAL DAILY
Status: DISCONTINUED | OUTPATIENT
Start: 2019-08-17 | End: 2019-08-18 | Stop reason: HOSPADM

## 2019-08-16 RX ORDER — MEPERIDINE HYDROCHLORIDE 50 MG/ML
12.5 INJECTION INTRAMUSCULAR; INTRAVENOUS; SUBCUTANEOUS ONCE AS NEEDED
Status: COMPLETED | OUTPATIENT
Start: 2019-08-16 | End: 2019-08-16

## 2019-08-16 RX ADMIN — MIDAZOLAM 2 MG: 1 INJECTION INTRAMUSCULAR; INTRAVENOUS at 07:26

## 2019-08-16 RX ADMIN — SODIUM CHLORIDE 125 ML/HR: 0.9 INJECTION, SOLUTION INTRAVENOUS at 06:27

## 2019-08-16 RX ADMIN — Medication 1 TABLET: at 14:43

## 2019-08-16 RX ADMIN — VANCOMYCIN HYDROCHLORIDE 1000 MG: 1 INJECTION, SOLUTION INTRAVENOUS at 07:20

## 2019-08-16 RX ADMIN — KETOROLAC TROMETHAMINE 30 MG: 30 INJECTION, SOLUTION INTRAMUSCULAR at 14:40

## 2019-08-16 RX ADMIN — CEFAZOLIN SODIUM 1000 MG: 1 SOLUTION INTRAVENOUS at 23:47

## 2019-08-16 RX ADMIN — TRANEXAMIC ACID 1 G: 1 INJECTION, SOLUTION INTRAVENOUS at 07:36

## 2019-08-16 RX ADMIN — ALBUMIN (HUMAN): 12.5 SOLUTION INTRAVENOUS at 08:55

## 2019-08-16 RX ADMIN — SODIUM CHLORIDE: 0.9 INJECTION, SOLUTION INTRAVENOUS at 08:00

## 2019-08-16 RX ADMIN — ONDANSETRON 4 MG: 2 INJECTION INTRAMUSCULAR; INTRAVENOUS at 07:36

## 2019-08-16 RX ADMIN — TRANEXAMIC ACID 1 G: 1 INJECTION, SOLUTION INTRAVENOUS at 09:12

## 2019-08-16 RX ADMIN — PROPOFOL 80 MCG/KG/MIN: 10 INJECTION, EMULSION INTRAVENOUS at 07:35

## 2019-08-16 RX ADMIN — CELECOXIB 200 MG: 200 CAPSULE ORAL at 14:44

## 2019-08-16 RX ADMIN — PANTOPRAZOLE SODIUM 40 MG: 40 TABLET, DELAYED RELEASE ORAL at 14:44

## 2019-08-16 RX ADMIN — SENNOSIDES 8.6 MG: 8.6 TABLET, FILM COATED ORAL at 14:48

## 2019-08-16 RX ADMIN — BUPIVACAINE HYDROCHLORIDE IN DEXTROSE 1.6 ML: 7.5 INJECTION, SOLUTION SUBARACHNOID at 07:31

## 2019-08-16 RX ADMIN — MEPERIDINE HYDROCHLORIDE 12.5 MG: 50 INJECTION INTRAMUSCULAR; INTRAVENOUS; SUBCUTANEOUS at 10:40

## 2019-08-16 RX ADMIN — CEFAZOLIN SODIUM 1000 MG: 1 SOLUTION INTRAVENOUS at 14:35

## 2019-08-16 RX ADMIN — SODIUM CHLORIDE, SODIUM LACTATE, POTASSIUM CHLORIDE, AND CALCIUM CHLORIDE 125 ML/HR: .6; .31; .03; .02 INJECTION, SOLUTION INTRAVENOUS at 14:49

## 2019-08-16 RX ADMIN — FENTANYL CITRATE 100 MCG: 50 INJECTION, SOLUTION INTRAMUSCULAR; INTRAVENOUS at 07:26

## 2019-08-16 RX ADMIN — CEFAZOLIN SODIUM 2000 MG: 2 SOLUTION INTRAVENOUS at 07:08

## 2019-08-16 RX ADMIN — SODIUM CHLORIDE: 0.9 INJECTION, SOLUTION INTRAVENOUS at 09:20

## 2019-08-16 RX ADMIN — SODIUM CHLORIDE: 0.9 INJECTION, SOLUTION INTRAVENOUS at 08:44

## 2019-08-16 RX ADMIN — SODIUM CHLORIDE, SODIUM LACTATE, POTASSIUM CHLORIDE, AND CALCIUM CHLORIDE 1000 ML: .6; .31; .03; .02 INJECTION, SOLUTION INTRAVENOUS at 10:30

## 2019-08-16 RX ADMIN — ALBUMIN (HUMAN): 12.5 SOLUTION INTRAVENOUS at 09:13

## 2019-08-16 RX ADMIN — DEXAMETHASONE SODIUM PHOSPHATE 4 MG: 4 INJECTION, SOLUTION INTRAMUSCULAR; INTRAVENOUS at 07:36

## 2019-08-16 RX ADMIN — ASPIRIN 81 MG: 81 TABLET, COATED ORAL at 18:43

## 2019-08-16 NOTE — ANESTHESIA PROCEDURE NOTES
Spinal Block    Patient location during procedure: OR  Start time: 8/16/2019 7:30 AM  Reason for block: procedure for pain and at surgeon's request  Staffing  Anesthesiologist: Beatriz Johnson DO  Resident/CRNA: Liz Wheeler CRNA  Performed: resident/CRNA   Preanesthetic Checklist  Completed: patient identified, site marked, surgical consent, pre-op evaluation, timeout performed, IV checked, risks and benefits discussed and monitors and equipment checked  Spinal Block  Patient position: sitting  Prep: ChloraPrep  Patient monitoring: cardiac monitor and frequent blood pressure checks  Approach: midline  Location: L3-4  Injection technique: single-shot  Needle  Needle type: pencil-tip   Needle gauge: 25 G  Needle length: 10 cm  Assessment  Sensory level: T4  Injection Assessment:  negative aspiration for heme, no paresthesia on injection and positive aspiration for clear CSF    Post-procedure:  adhesive bandage applied, pressure dressing applied, secured with tape, site cleaned and sterile dressing applied

## 2019-08-16 NOTE — PLAN OF CARE
Problem: HEMATOLOGIC - ADULT  Goal: Maintains hematologic stability  Description  INTERVENTIONS  - Assess for signs and symptoms of bleeding or hemorrhage  - Monitor labs  - Administer supportive blood products/factors as ordered and appropriate  Outcome: Progressing   Plan for blood transfusion today

## 2019-08-16 NOTE — DISCHARGE SUMMARY
DISCHARGE SUMMARY      Patient Name: Humberto Fuentes  Patient MRN: [id number]  Admitting Provider: Gómez Recinos MD  Discharging Provider: Gómez Recinos MD  Primary Care Physician at Discharge: Conor Brittaneyantolin [place] [phone]  Admission Date: 8/16/2019       Discharge Date: 8/17/19    Admission Diagnosis  Primary osteoarthritis of right hip [M16 11]    Discharge Diagnoses  [facility] Course  Humberto Fuentes was admitted to [facility] on 8/16/2019, with diagnosis of osteoarthritis in her Right hip  On the day of admission, she was brought to surgery, successfully underwent a Right hip replacement  She tolerated the procedure well  Following surgery, she was taken to the recovery room, at which time, there was a consult placed for Dr Km Ibrahim for the patient's medical management  After a short stay in the recovery room, she was transferred to the orthopedic floor at which time, she was resumed on her routine home medications per the hospitalist group  On postop day #1, she was able to start some physical therapy and was able to tolerate it well  At this time, she was in stable condition, showing no sign of deep vein thrombosis or pulmonary embolism  Incision was healing well at the time and showed no signs of infection  DISCHARGE DIAGNOSIS: Primary osteoarthritis of right hip [M16 11]  She is now status post Right total hip replacement, which she underwent during the hospital stay  Medications  See after visit summary for reconciled discharge medications provided to patient and family  Allergies  Allergies   Allergen Reactions    Amoxicillin Rash     Reaction Date: [id number];     Azithromycin Rash     Reaction Date: [id number]; Outpatient Follow-Up  No future appointments      Discharge Disposition  Home GOOD CONDITION    Operative Procedures Performed  Procedure(s):  ARTHROPLASTY HIP TOTAL ANTERIOR        Tomasa Faith PA-C    DISCHARGE SUMMARY patient was critically ill... Patient was critically ill with a high probability of imminent or life threatening deterioration.

## 2019-08-16 NOTE — ANESTHESIA POSTPROCEDURE EVALUATION
Post-Op Assessment Note    CV Status:  Stable    Pain management: adequate     Mental Status:  Alert and awake   Hydration Status:  Euvolemic   PONV Controlled:  Controlled   Airway Patency:  Patent   Post Op Vitals Reviewed: Yes      Staff: Anesthesiologist           /61 (08/16/19 1051)    Temp     Pulse 66 (08/16/19 1051)   Resp 18 (08/16/19 1051)    SpO2 98 % (08/16/19 1051)

## 2019-08-16 NOTE — PHYSICAL THERAPY NOTE
PHYSICAL THERAPY NOTE          Patient Name: James Oswald  XVLOX'[id number] Date: 8/16/2019  Time In: 14:45  Time Out: 15:15       08/16/19 1500   Note Type   Note type Eval/Treat   Pain Assessment   Pain Assessment 0-10   Pain Score 5   Pain Type Surgical pain   Pain Location Hip   Pain Orientation Right   Clinical Progression Not changed   Home Living   Type of [address] One level   Bathroom Shower/Tub Tub/shower unit   Bathroom Toilet Standard   Bathroom Accessibility Accessible   Home Equipment Walker   Additional Comments Pt lives in ranch, ramp to enter home, small step for threshold   Prior Function   Level of [place] Independent with ADLs and functional mobility   Lives With Alone   Receives Help From Family; Other (Comment)  (Family lives in house on same property)   ADL Assistance Independent   IADLs Independent   Falls in the last 6 months 0   Restrictions/Precautions   Weight Bearing Precautions Per Order Yes   RLE Weight Bearing Per Order WBAT   General   Family/Caregiver Present Yes   Cognition   Overall Cognitive Status WFL   Arousal/Participation Alert   Orientation Level Oriented X4   Memory Within functional limits   Following Commands Follows all commands and directions without difficulty   RLE Assessment   RLE Assessment X   Strength RLE   R Hip Flexion 3/5   R Hip Extension 3/5   R Hip ABduction 3/5   R Hip ADduction 3/5   R Knee Flexion 3/5   R Knee Extension 3/5   R Ankle Dorsiflexion 3/5   R Ankle Plantar Flexion 3/5   LLE Assessment   LLE Assessment X   Strength LLE   L Hip Flexion 4/5   L Hip Extension 4/5   L Hip ABduction 4/5   L Hip ADduction 4/5   L Knee Flexion 4/5   L Knee Extension 4/5   L Ankle Dorsiflexion 4/5   L Ankle Plantar Flexion 4/5   Light Touch   RLE Light Touch Grossly intact   LLE Light Touch Grossly intact   Wound 08/16/19 Incision Hip Right   Date First Assessed/Time First Assessed: 08/16/19 0926   Primary Wound Type: Incision  Location: Hip  Wound Location Orientation: Right  Wound Description (Comments): One incision closed with staples; acticoat/4x4s/tegaderm  Wound Description Clean;Dry;SHIRA;Other (Comment)  (Bandage clean and dry)   Bed Mobility   Supine to Sit 4  Minimal assistance   Additional items HOB elevated; Bedrails;LE management;Verbal cues; Increased time required   Sit to Supine 4  Minimal assistance   Additional items Assist x 1; Increased time required;Verbal cues;LE management; Bedrails;HOB elevated   Transfers   Sit to Stand 4  Minimal assistance   Additional items Assist x 1;Bedrails; Increased time required;Verbal cues   Stand to Sit 4  Minimal assistance   Additional items Assist x 1; Increased time required;Verbal cues; Bedrails   Ambulation/Elevation   Gait pattern Wide MERCEDES; Antalgic; Short stride; Excessively slow; Step to   Gait Assistance 4  Minimal assist   Additional items Assist x 1;Verbal cues; Tactile cues   Assistive Device Rolling walker   Distance 8 feet   Stair Management Assistance Not tested   Balance   Static Sitting Fair +   Dynamic Sitting Fair +   Static Standing Fair   Dynamic Standing Fair -   Ambulatory Fair -   Activity Tolerance   Activity Tolerance Patient limited by fatigue;Patient limited by pain; Patient tolerated treatment well   Medical Staff Made Aware Sushila   Nurse Made Aware Sushila   Assessment   Prognosis Good   Problem List Decreased strength;Decreased range of motion;Decreased endurance; Impaired balance;Decreased mobility;Orthopedic restrictions;Pain   Assessment Dee Steiner is a 53 y o  female who presents to IP PT with R ALEA anterior approach performed on 08/16/19  Pt reporting minimal numbness in R anterior thigh upon PT arrival  Pt PLOF independent with all IADL and ADL  Lives in ranch home on same property as family with ramp to enter home and a small step to go over threshold   Pt min A x 1 supine to sit, sit to supine with LE management and VC needed  Pt vitals /72 mm Hg, HR 73 bpm, O2 saturation 100% seated at edge of bed  Min A x 1 able to ambulate 8 feet to chair with RW, R antalgic gait, increased step width compensation, WBAT R LE  PT reviewed hip precautions and pt WBAT with standing and gait with pt with verbalized understanding and compliance  Pt denying any lightheadedness or dizziness with transfers and ambulation  Patient presents with the following deficits: increased pain and fall risk, decreased strength, endurance, balance, and tolerance to activities  Patient would benefit from skilled PT in order to address these deficits in order to be discharged to home for [facility] PT and outpatient PT  Pt left seated in chair with nurse Sushila changing IV medication  Barriers to Discharge Comments Based on pt ability to meet rehab goals established   Goals   STG Expiration Date 08/23/19   Short Term Goal #1 1)  Pt will perform bed mobility with mod I demonstrating appropriate technique *in order to improve function  2)  Perform all transfers with mod I demonstrating safe and appropriate technique in order to improve ability to negotiate safely in home environment  3) Amb with least restrictive AD > 200'x1 with mod I in order to demonstrate ability to negotiate in home environment  4)  Improve overall strength and balance 1/2 grade in order to optimize ability to perform functional tasks and reduce fall risk  5) Increase activity tolerance to 45 minutes in order to improve endurance to functional tasks  6)  Negotiate stairs using most appropriate technique and S in order to be able to negotiate safely in home environment  7) PT for ongoing patient and family/caregiver education, DME needs and d/c planning in order to promote highest level of function in least restrictive environment  Treatment Day 0   Plan   Treatment/Interventions ADL retraining;Functional transfer training;LE strengthening/ROM; Therapeutic exercise; Endurance training;Patient/family training;Gait training;Spoke to nursing;Family   PT Frequency Twice a day;7x/wk; Weekend   Recommendation   Recommendation Home PT;Outpatient PT; Home with family support   Additional Comments Based on pt medical status and ability to achieve rehab goals   Barthel Index   Feeding 10   Bathing 5   Grooming Score 5   Dressing Score 10   Bladder Score 0   Bowels Score 10   Toilet Use Score 10   Transfers (Bed/Chair) Score 10   Mobility (Level Surface) Score 0   Stairs Score 0   Barthel Index Score 60     Hx/personal factors: co-morbidities, use of AD, pain, total hip precautions and fall risk  Examination: dec mobility, dec balance, dec endurance, dec amb, moderate fall risk, pain  Clinical: unpredictable (ongoing medical status, abnormal lab values, moderate fall risk and POD #0)  Complexity: high     Agueda Paul, PT

## 2019-08-16 NOTE — ADDENDUM NOTE
Addendum  created 08/16/19 1123 by Ilean Ahumada, DO    Intraprocedure [facility] edited, Order list changed, Order sets accessed

## 2019-08-16 NOTE — PLAN OF CARE
Problem: PAIN - ADULT  Goal: Verbalizes/displays adequate comfort level or baseline comfort level  Description  Interventions:  - Encourage patient to monitor pain and request assistance  - Assess pain using appropriate pain scale  - Administer analgesics based on type and severity of pain and evaluate response  - Implement non-pharmacological measures as appropriate and evaluate response  - Consider cultural and social influences on pain and pain management  - Notify physician/advanced practitioner if interventions unsuccessful or patient reports new pain  Outcome: Progressing     Problem: INFECTION - ADULT  Goal: Absence or prevention of progression during hospitalization  Description  INTERVENTIONS:  - Assess and monitor for signs and symptoms of infection  - Monitor lab/diagnostic results  - Monitor all insertion sites, i e  indwelling lines, tubes, and drains  - Monitor endotracheal if appropriate and nasal secretions for changes in amount and color  - [place] appropriate cooling/warming therapies per order  - Administer medications as ordered  - Instruct and encourage patient and family to use good hand hygiene technique  - Identify and instruct in appropriate isolation precautions for identified infection/condition  Outcome: Progressing     Problem: SAFETY ADULT  Goal: Patient will remain free of falls  Description  INTERVENTIONS:  - Assess patient frequently for physical needs  -  Identify cognitive and physical deficits and behaviors that affect risk of falls    -  [place] fall precautions as indicated by assessment   - Educate patient/family on patient safety including physical limitations  - Instruct patient to call for assistance with activity based on assessment  - Modify environment to reduce risk of injury  - Consider OT/PT consult to assist with strengthening/mobility  Outcome: Progressing     Problem: DISCHARGE PLANNING  Goal: Discharge to home or other facility with appropriate resources  Description  INTERVENTIONS:  - Identify barriers to discharge w/patient and caregiver  - Arrange for needed discharge resources and transportation as appropriate  - Identify discharge learning needs (meds, wound care, etc )  - Arrange for interpretive services to assist at discharge as needed  - Refer to Case Management Department for coordinating discharge planning if the patient needs post-hospital services based on physician/advanced practitioner order or complex needs related to functional status, cognitive ability, or social support system  Outcome: Progressing     Problem: Knowledge Deficit  Goal: Patient/family/caregiver demonstrates understanding of disease process, treatment plan, medications, and discharge instructions  Description  Complete learning assessment and assess knowledge base    Interventions:  - Provide teaching at level of understanding  - Provide teaching via preferred learning methods  Outcome: Progressing     Problem: COPING  Goal: Pt/Family able to verbalize concerns and demonstrate effective coping strategies  Description  INTERVENTIONS:  - Assist patient/family to identify coping skills, available support systems and cultural and spiritual values  - Provide emotional support, including active listening and acknowledgement of concerns of patient and caregivers  - Reduce environmental stimuli, as able  - Provide patient education  - Assess for spiritual pain/suffering and initiate spiritual care, including notification of Pastoral Care or yomaira based community as needed  - Assess effectiveness of coping strategies  Outcome: Progressing     Problem: GENITOURINARY - ADULT  Goal: Absence of urinary retention  Description  INTERVENTIONS:  - Assess patients ability to void and empty bladder  - Monitor I/O  - Bladder scan as needed  - Discuss with physician/AP medications to alleviate retention as needed  - Discuss catheterization for long term situations as appropriate  Outcome: Progressing  Goal: Urinary catheter remains patent  Description  INTERVENTIONS:  - Assess patency of urinary catheter  - If patient has a chronic jefferson, consider changing catheter if non-functioning  - Follow guidelines for intermittent irrigation of non-functioning urinary catheter  Outcome: Progressing

## 2019-08-16 NOTE — PLAN OF CARE
Problem: PAIN - ADULT  Goal: Verbalizes/displays adequate comfort level or baseline comfort level  Description  Interventions:  - Encourage patient to monitor pain and request assistance  - Assess pain using appropriate pain scale  - Administer analgesics based on type and severity of pain and evaluate response  - Implement non-pharmacological measures as appropriate and evaluate response  - Consider cultural and social influences on pain and pain management  - Notify physician/advanced practitioner if interventions unsuccessful or patient reports new pain  Outcome: Progressing     Problem: INFECTION - ADULT  Goal: Absence or prevention of progression during hospitalization  Description  INTERVENTIONS:  - Assess and monitor for signs and symptoms of infection  - Monitor lab/diagnostic results  - Monitor all insertion sites, i e  indwelling lines, tubes, and drains  - Monitor endotracheal if appropriate and nasal secretions for changes in amount and color  - [place] appropriate cooling/warming therapies per order  - Administer medications as ordered  - Instruct and encourage patient and family to use good hand hygiene technique  - Identify and instruct in appropriate isolation precautions for identified infection/condition  Outcome: Progressing     Problem: SAFETY ADULT  Goal: Patient will remain free of falls  Description  INTERVENTIONS:  - Assess patient frequently for physical needs  -  Identify cognitive and physical deficits and behaviors that affect risk of falls    -  [place] fall precautions as indicated by assessment   - Educate patient/family on patient safety including physical limitations  - Instruct patient to call for assistance with activity based on assessment  - Modify environment to reduce risk of injury  - Consider OT/PT consult to assist with strengthening/mobility  Outcome: Progressing     Problem: DISCHARGE PLANNING  Goal: Discharge to home or other facility with appropriate resources  Description  INTERVENTIONS:  - Identify barriers to discharge w/patient and caregiver  - Arrange for needed discharge resources and transportation as appropriate  - Identify discharge learning needs (meds, wound care, etc )  - Arrange for interpretive services to assist at discharge as needed  - Refer to Case Management Department for coordinating discharge planning if the patient needs post-hospital services based on physician/advanced practitioner order or complex needs related to functional status, cognitive ability, or social support system  Outcome: Progressing     Problem: Knowledge Deficit  Goal: Patient/family/caregiver demonstrates understanding of disease process, treatment plan, medications, and discharge instructions  Description  Complete learning assessment and assess knowledge base    Interventions:  - Provide teaching at level of understanding  - Provide teaching via preferred learning methods  Outcome: Progressing     Problem: COPING  Goal: Pt/Family able to verbalize concerns and demonstrate effective coping strategies  Description  INTERVENTIONS:  - Assist patient/family to identify coping skills, available support systems and cultural and spiritual values  - Provide emotional support, including active listening and acknowledgement of concerns of patient and caregivers  - Reduce environmental stimuli, as able  - Provide patient education  - Assess for spiritual pain/suffering and initiate spiritual care, including notification of Pastoral Care or yomaira based community as needed  - Assess effectiveness of coping strategies  Outcome: Progressing

## 2019-08-16 NOTE — DISCHARGE INSTRUCTIONS
[facility]   Post Operative Joint Replacement Instructions   Dr Anastasio Buerger Office [address] [phone]     Remove dressing post op day 7  Home Health care will remove the staples/sutures post op day 14  MEDICATIONS      *  Enteric Coated Aspirin 81 mg:  Start taking this twice daily on discharge and continue for 6 weeks  Continue as you were in the hospital/rehab facility     * Iron: Continue the iron supplement twice daily  If you experience nausea or constipation with the medicine discontinue its use  Contact us if the nausea persists  * Pain Medications: Your prescriptions may run out before you return for your next visit  Please give us two regular office day's notice before you run out, to prevent any problems of not having pain medicine  Be advised that prescriptions for Percocet and OxyContin cannot be phoned to your pharmacy  They will need to be picked up at our office  Please refrain from using alcohol with your pain medicines  Percocet contains 325 mg of Tylenol  You should not exceed 3000 mg of Tylenol in a day  Please be careful to not overdose the Tylenol  * Herbal Medicines: Please hold all these preparations until after your first post-operative visit  Unless specifically directed otherwise  ACTIVITY   * Your weight bearing status is: as tolerated     * If you have been furnished with an assistive device  Please feel free to try to wean from using it under the supervision of your therapist      * You may participate in activity as tolerated  It is likely that you will tire more easily for a time after surgery  Please rest when you need it to help your healing process  * Stairs are not restricted for you  Please climb stairs as instructed by your physical therapist      * For hip and knee replacement patients please elevate your leg or legs while resting  This is important to prevent lower leg swelling       * When you are back at home you will likely have physical therapy three times a week  On the days you do not have formal therapy please perform the home exercises given to you  * If you had a hip replacement, please follow the safety precautions given to you by the nurse or therapist taking care of you  * Immediately following the surgery you are not permitted to drive until cleared by your surgeon  This typically does not happen until your first visit after surgery  * Knee replacement patients may be passengers in a vehicle following their discharge from the hospital      * Hip replacement patients are not allowed in a vehicle, except for your trip home and your first follow up visit  Please follow these guidelines unless specifically instructed to start outpatient therapy at an earlier time  * Please do not perform lifting tasks or strenuous domestic activities  * If you currently are employed, you are off work until cleared by your surgeon  Everyone's ability to return to work is determined by his or her abilities  Your return to work may take a few weeks to a few months  * Sexual activities are permitted as tolerated  NORMAL FINDINGS   * Numbness along the incision     * Mechanical click of a knee replacement  * Your incision area will feel warm  WOUND CARE   * It is OK to shower once there is no spotting or drainage for a full 24 hours  Please do not submerge the incision into a pool, bath or hot tub until after your 1st post-operative visit  * Please do not disturb your staples, sutures, or the scabs  It promotes better healing and smaller scars  * Cover the incision with gauze dressing until there is no further drainage  * You may leave the incision open to the air when there is no discharge left on the gauze at changing time  Once this occurs you may shower  * Please be generous with the use of ice  It is a very good remedy  Apply ice for only twenty minutes at a time   You may use it every hour  It is recommended to ice before and after anticipated activities, which includes exercise and physical therapy  * Wear your knee-high pressure stockings every day  They may be removed for sleep at night  Continue to wear them until you are seen for your first follow up  * For knee and hip replacements; your staples will be removed about 14 days after your surgery date  Home nurses and physical therapists typically remove them  If they are unable to, please call our office to have us do it for you  FOLLOW UP   * You should have a scheduled visit with your surgeon scheduled for three to four weeks after surgery  If you do not remember your appointment date and time, or if you are sure you do not have one, please call to set one up  * Please inform the office if you experience one of the following:    - Fever that is not responding to Tylenol and is above 101 degrees    - Redness of the skin that is increasing in area    - Your incision is soaking the dressings with drainage or blood    - You're unable to bear weight on your operative leg or legs

## 2019-08-16 NOTE — OP NOTE
OPERATIVE REPORT  PATIENT NAME: Brigette Nathan    :  1965  MRN: [id number]  Pt Location: [facility] OR ROOM 02    SURGERY DATE: 2019    Operative Note    Brigette Nathan  2019    Pre-op Diagnosis:   Right Hip Osteoarthritis    Post-op Diagnosis:   Right Hip Osteoarthritis    Procedure:  Right Hip Replacement    Surgeon:  Odette Amor MD    Assistants:  MAGO Rodríguez CFA       Anesthesia:  Spinal    Estimated Blood Loss:  1700 cc    Components:   Implant Name Type Inv  Item Serial No   Lot No  LRB No  Used   SHELL ACETABULAR 56MM POROUS SOLID LCK RING PINNACLE - [id number]  SHELL ACETABULAR 56MM POROUS SOLID LCK RING PINNACLE  DEPUY [id number] Right 1   SHELL ACETABULAR 58MM POROUS SOLID LCK RING PINNACLE - [id number]  SHELL ACETABULAR 58MM POROUS SOLID LCK RING PINNACLE  DEPUY [id number] Right 1   SCREW ROBINA 6 5 X 25MM SLF TAP PINNACLE - [id number]  SCREW ROBINA 6 5 X 25MM SLF TAP PINNACLE  DEPUY [id number] Right 1   SCREW ROBINA 6 5 X 20MM SLF TAP PINNACLE - [id number]  SCREW ROBINA 6 5 X 20MM SLF TAP PINNACLE  DEPUY [id number] Right 1   SCREW ROBINA 6 5 X 25MM SLF TAP PINNACLE - [id number]  SCREW ROBINA 6 5 X 25MM SLF TAP PINNACLE  DEPUY [id number] Right 1   LINER ACTB ULT LNK PE 36 X 58MM 0DEG NEUTRAL ALTRX - [id number]  LINER ACTB ULT LNK PE 36 X 58MM 0DEG NEUTRAL ALTRX  DEPUY [id number] Right 1   STEM FEM PRESS FIT  TAPER SZ 8 COLR STD CORAIL - [id number]  STEM FEM PRESS FIT  TAPER SZ 8 COLR STD CORAIL  DEPUY [id number] Right 1   HEAD FEMORAL  TPR 36MM +1 5MM ARTICULEZE BIOLOX DELTA - [id number]  HEAD FEMORAL  TPR 36MM +1 5MM ARTICULEZE BIOLOX DELTA  DEPUY [id number] Right 1   Note: the 58mm cup was used  Two screws were used not 3  APPROACH:   Direct anterior  INDICATION: Brigette Nathan is a 53 y o  female with advanced osteoarthritis of the Right hip, which was refractory to nonoperative therapy  She understood the risks and benefits of hip replacement and wished to proceed  DESCRIPTION OF PROCEDURE: On 8/16/2019 the patient was brought to the holding area  The identity and surgical site were confirmed by her and the surgeon  Perioperative intravenous antibiotics were given  Anesthesia was administered by the anesthesia team  The leg length examination was performed with her in the supine position after anesthesia  The operative extremity was shorter by 6-7mm  Then the patient was positioned on the HANA table and preoperative fluoroscopic views were obtained  Then the Right extremity was prepped and draped in the usual sterile manner  A direct anterior approach was performed to the hip in the usual manner, exposing the capsule and doing a capsulotomy  A femoral neck osteotomy was made  The head was removed  Acetabulum was exposed and reamed sequentially  It was an oblong acetabulum and therefore we had to use a larger cup to obtain a hemispherical shape and also ream a little superiorly to get good purchase from that portion of the acetabulum  This was eventually underreamed by 1mm for the impaction of the 58mm cup with excellent stability to host bone  This was impacted under direct fluoroscopic guidance to make sure we were satisfied with the position  Also, there was good coverage anteriorly to prevent any impingement  Two screws were placed in the posterior-superior quadrant  Both screws thankfully had excellent bite  Then the polyethylene liner was impacted into the cup and it was down all the way around  Attention was then turned to the femur  Appropriate releases were made  The leg was dropped into hyperextension, external rotation  The canal was accessed without difficulty  It was broached to fit the appropriate size stem, which gave excellent stability within the host bone, both axially and rotationally  We used the reamers distally to prevent any potting  We used the calcar planer as well until we were satisfied with the position of the broach relative to the proximal femur  We trialed  We were able to achieve good stability and good restoration of leg lengths and the stem size and neck were chosen as 8KA  Therefore, trial components were removed  The femoral canal was irrigated and the stem was impacted until it reached a firm endpoint, and it had excellent stability within the host bone, both axially and rotationally  We trialed with the different ball options and the 1 5 ball was chosen based on stabililty, measurement of leg lengths by fluoroscopic views, and the soft tissue tension to be appropriate and not excessively lax or tight  With that, there was excellent stability as the hip was able to come to 120 degrees of external rotation, in neutral, it was able to come to 100 degrees of external rotation, 30 degrees of hyperextension, had a firm endpoint posteriorly and the soft tissue tension was appropriate without being excessively lax or tight  Also, the leg length restoration appeared to be appropriate given the fluoroscopic measurements and the preoperative leg length examination  Therefore, the 1 5 ball was chosen and impacted with appropriate force onto a clean trunnion with 3 blows of the mallet  The hip was then reduced  It was irrigated copiously with several liters of pulsed lavage  A periarticular injection was injected for postoperative analgesia  The capsulotomy and the fascia were closed with #1 Vicryl suture  The deep fat layer was approximated with Vicryl suture  The skin was closed using Vicryl sutures, nylon suture, staples, skin adhesive and a sterile dressing  The patient was then awakened and taken to the recovery room  The assistance of Crystal Redding was essential as no qualified resident assistance was available       XRAY REPORT: Postoperative x-rays including an AP view of the Right hip as well as fluoroscopic views of the Right hip taken intraoperatively on 8/16/2019 were checked and revealed a Right hip replacement in good alignment with no fracture or dislocation         Oly Delaney MD     Date: 8/16/2019  Time: 5:37 PM

## 2019-08-16 NOTE — PLAN OF CARE
Problem: PHYSICAL THERAPY ADULT  Goal: Performs mobility at highest level of function for planned discharge setting  See evaluation for individualized goals  Description  Treatment/Interventions: ADL retraining, Functional transfer training, LE strengthening/ROM, Therapeutic exercise, Endurance training, Patient/family training, Gait training, Spoke to nursing, Family          See flowsheet documentation for full assessment, interventions and recommendations  Note:   Prognosis: Good  Problem List: Decreased strength, Decreased range of motion, Decreased endurance, Impaired balance, Decreased mobility, Orthopedic restrictions, Pain  Assessment: Paco Lawrence is a 53 y o  female who presents to IP PT with R ALEA anterior approach performed on 08/16/19  Pt reporting minimal numbness in R anterior thigh upon PT arrival  Pt PLOF independent with all IADL and ADL  Lives in ranch home on same property as family with ramp to enter home and a small step to go over threshold  Pt min A x 1 supine to sit, sit to supine with LE management and VC needed  Pt vitals /72 mm Hg, HR 73 bpm, O2 saturation 100% seated at edge of bed  Min A x 1 able to ambulate 8 feet to chair with RW, R antalgic gait, increased step width compensation, WBAT R LE  PT reviewed hip precautions and pt WBAT with standing and gait with pt with verbalized understanding and compliance  Pt denying any lightheadedness or dizziness with transfers and ambulation  Patient presents with the following deficits: increased pain and fall risk, decreased strength, endurance, balance, and tolerance to activities  Patient would benefit from skilled PT in order to address these deficits in order to be discharged to home for [facility] PT and outpatient PT  Pt left seated in chair with nurse Sushila changing IV medication        Barriers to Discharge Comments: Based on pt ability to meet rehab goals established  Recommendation: Home PT, Outpatient PT, Home with family support See flowsheet documentation for full assessment

## 2019-08-16 NOTE — INTERVAL H&P NOTE
H&P reviewed  After examining the patient I find no changes in the patients condition since the H&P had been written      Vitals:    08/16/19 0613   BP: 139/88   Pulse: 86   Resp: 16   Temp: (!) 97 2 °F (36 2 °C)   SpO2: 100%

## 2019-08-16 NOTE — PROGRESS NOTES
Doing well  CDI  5/5 EFTG operative side  A/P: PT; Med input; pain control  Had a long discussion with her about her Hb: she had very vascular bone including acetabulum and femur  Therefore, her Hb dropped to 8 2 from 12 3  Anesthesia ordered a unit of PRBC but the order could not be used on the floor  I discussed this with her  She said she has already gotten out of bed and walked to a chair and sat for an hour without any dizziness etc   She said her Hb dropped to 8 in the past when she was a bone marrow donor without any ill effects  Also she denies heart disease  Therefore, we agreed on holding off on a transfusion order today and to check Hb tomorrow  Also I discussed this with Dr Yassine Jamil as well who will evaluate her later on

## 2019-08-17 LAB
ANION GAP SERPL CALCULATED.3IONS-SCNC: 6 MMOL/L (ref 4–13)
ATRIAL RATE: 87 BPM
BUN SERPL-MCNC: 14 MG/DL (ref 5–25)
CALCIUM SERPL-MCNC: 8.4 MG/DL (ref 8.3–10.1)
CHLORIDE SERPL-SCNC: 111 MMOL/L (ref 100–108)
CO2 SERPL-SCNC: 27 MMOL/L (ref 21–32)
CREAT SERPL-MCNC: 0.82 MG/DL (ref 0.6–1.3)
GFR SERPL CREATININE-BSD FRML MDRD: 81 ML/MIN/1.73SQ M
GLUCOSE SERPL-MCNC: 98 MG/DL (ref 65–140)
HCT VFR BLD AUTO: 23.2 % (ref 34.8–46.1)
HGB BLD-MCNC: 7.3 G/DL (ref 11.5–15.4)
POTASSIUM SERPL-SCNC: 4.4 MMOL/L (ref 3.5–5.3)
PR INTERVAL: 162 MS
QRS AXIS: 151 DEGREES
QRSD INTERVAL: 80 MS
QT INTERVAL: 368 MS
QTC INTERVAL: 442 MS
SODIUM SERPL-SCNC: 144 MMOL/L (ref 136–145)
T WAVE AXIS: 138 DEGREES
VENTRICULAR RATE: 87 BPM

## 2019-08-17 PROCEDURE — 85014 HEMATOCRIT: CPT | Performed by: PHYSICIAN ASSISTANT

## 2019-08-17 PROCEDURE — 85018 HEMOGLOBIN: CPT | Performed by: PHYSICIAN ASSISTANT

## 2019-08-17 PROCEDURE — 97116 GAIT TRAINING THERAPY: CPT

## 2019-08-17 PROCEDURE — 93010 ELECTROCARDIOGRAM REPORT: CPT | Performed by: INTERNAL MEDICINE

## 2019-08-17 PROCEDURE — 80048 BASIC METABOLIC PNL TOTAL CA: CPT | Performed by: PHYSICIAN ASSISTANT

## 2019-08-17 PROCEDURE — 97110 THERAPEUTIC EXERCISES: CPT

## 2019-08-17 PROCEDURE — 93005 ELECTROCARDIOGRAM TRACING: CPT

## 2019-08-17 PROCEDURE — 97530 THERAPEUTIC ACTIVITIES: CPT

## 2019-08-17 RX ORDER — ASPIRIN 81 MG/1
81 TABLET ORAL 2 TIMES DAILY
Qty: 90 TABLET | Refills: 0 | Status: SHIPPED | OUTPATIENT
Start: 2019-08-17 | End: 2021-10-27 | Stop reason: ALTCHOICE

## 2019-08-17 RX ORDER — FERROUS SULFATE 325(65) MG
325 TABLET ORAL 2 TIMES DAILY WITH MEALS
Status: DISCONTINUED | OUTPATIENT
Start: 2019-08-17 | End: 2019-08-18 | Stop reason: HOSPADM

## 2019-08-17 RX ORDER — TRAMADOL HYDROCHLORIDE 50 MG/1
50 TABLET ORAL EVERY 6 HOURS PRN
Qty: 40 TABLET | Refills: 0 | Status: SHIPPED | OUTPATIENT
Start: 2019-08-17 | End: 2019-08-27

## 2019-08-17 RX ORDER — ACETAMINOPHEN 325 MG/1
650 TABLET ORAL EVERY 8 HOURS SCHEDULED
Status: DISCONTINUED | OUTPATIENT
Start: 2019-08-17 | End: 2019-08-18 | Stop reason: HOSPADM

## 2019-08-17 RX ORDER — CELECOXIB 200 MG/1
200 CAPSULE ORAL DAILY
Qty: 30 CAPSULE | Refills: 0 | Status: SHIPPED | OUTPATIENT
Start: 2019-08-17 | End: 2021-10-27 | Stop reason: ALTCHOICE

## 2019-08-17 RX ORDER — METHOCARBAMOL 500 MG/1
500 TABLET, FILM COATED ORAL EVERY 6 HOURS PRN
Qty: 40 TABLET | Refills: 0 | Status: SHIPPED | OUTPATIENT
Start: 2019-08-17 | End: 2021-10-27 | Stop reason: ALTCHOICE

## 2019-08-17 RX ORDER — OXYCODONE HYDROCHLORIDE 5 MG/1
5-10 TABLET ORAL EVERY 6 HOURS PRN
Qty: 60 TABLET | Refills: 0 | Status: SHIPPED | OUTPATIENT
Start: 2019-08-17 | End: 2019-08-27

## 2019-08-17 RX ADMIN — TRAMADOL HYDROCHLORIDE 50 MG: 50 TABLET, FILM COATED ORAL at 14:24

## 2019-08-17 RX ADMIN — ASPIRIN 81 MG: 81 TABLET, COATED ORAL at 09:36

## 2019-08-17 RX ADMIN — ACETAMINOPHEN 650 MG: 325 TABLET ORAL at 21:48

## 2019-08-17 RX ADMIN — TRAMADOL HYDROCHLORIDE 50 MG: 50 TABLET, FILM COATED ORAL at 07:35

## 2019-08-17 RX ADMIN — FERROUS SULFATE TAB 325 MG (65 MG ELEMENTAL FE) 325 MG: 325 (65 FE) TAB at 07:36

## 2019-08-17 RX ADMIN — DOCUSATE SODIUM 50 MG: 50 CAPSULE, LIQUID FILLED ORAL at 14:23

## 2019-08-17 RX ADMIN — Medication 1 TABLET: at 09:36

## 2019-08-17 RX ADMIN — ASPIRIN 81 MG: 81 TABLET, COATED ORAL at 16:48

## 2019-08-17 RX ADMIN — OXYCODONE HYDROCHLORIDE AND ACETAMINOPHEN 250 MG: 500 TABLET ORAL at 09:36

## 2019-08-17 RX ADMIN — SODIUM CHLORIDE, SODIUM LACTATE, POTASSIUM CHLORIDE, AND CALCIUM CHLORIDE 125 ML/HR: .6; .31; .03; .02 INJECTION, SOLUTION INTRAVENOUS at 09:18

## 2019-08-17 RX ADMIN — FERROUS SULFATE TAB 325 MG (65 MG ELEMENTAL FE) 325 MG: 325 (65 FE) TAB at 16:44

## 2019-08-17 RX ADMIN — SENNOSIDES 8.6 MG: 8.6 TABLET, FILM COATED ORAL at 09:36

## 2019-08-17 RX ADMIN — PANTOPRAZOLE SODIUM 40 MG: 40 TABLET, DELAYED RELEASE ORAL at 06:23

## 2019-08-17 RX ADMIN — CELECOXIB 200 MG: 200 CAPSULE ORAL at 09:36

## 2019-08-17 RX ADMIN — ACETAMINOPHEN 650 MG: 325 TABLET ORAL at 14:24

## 2019-08-17 RX ADMIN — SODIUM CHLORIDE, SODIUM LACTATE, POTASSIUM CHLORIDE, AND CALCIUM CHLORIDE 125 ML/HR: .6; .31; .03; .02 INJECTION, SOLUTION INTRAVENOUS at 16:45

## 2019-08-17 NOTE — OCCUPATIONAL THERAPY NOTE
Occupational Therapy         Patient Name: Humberto Fuentes  [id number] Date: 8/17/2019    MD's orders received  Per P T 's report, pt is doing well with their mobility/balance without major functional deficits noted  Acute OT tx not indicated at this time 2* limited functional deficits   Migel Kraus OTR/L

## 2019-08-17 NOTE — PROGRESS NOTES
Progress Note - Internal Medicine   Kiel Montanez 53 y o  female MRN: [id number]  Unit/Bed#: E2 -01 Encounter: [id number]      Impression :    1  Post op #d1, right total hip replacement by Dr Jefferson Baird  2  Advanced end-stage DJD right hip failed conservative treatments  3  Previous history of left knee replacement, 09/02/2014  4  Acute blood loss anemia, hemoglobin 8 2 -->>7 3  5  Mild clinical volume depletion due to poor intake  6  Left knee patellofemoral DJD  7  Obesity with BMI 30 8 outpatient      Recommendation:    Patient is recuperating well following her surgery  Her hemoglobin has dropped secondary to acute blood loss anemia  Discussed with the patient options of getting blood transfusion  After long discussion with the patient giving her risk and benefits and after patient called her daughter who herself is a registered nurse in [facility] they have decided not to take blood transfusion with clear understanding of her low hemoglobin  Patient had 1 episode when she stood up this morning of lightheadedness but she indicates that that was related to her pain  She does not want to get blood transfused and wants to wait till tomorrow to recheck her hemoglobin  Discussed with the nursing staff and they indicated that patient has blood prepared by the blood bank ready to go but patient is at this time not consenting for transfusion  We will respect patient's choices and advised that she should take ferrous sulfate ascorbic acid and if she has any new symptoms should immediately notify the staff  She may have increased risk of angina TIAs dizziness syncope gray out etc which was all explained to her in detail  In the meantime I would be cautious with her rehab discussed with therapy team and would not be very aggressive and if she has any symptoms of concern would about therapy at that stage  Continue pain control, close clinical monitoring and reassess within next 24 hours    Patient is not ready for discharge  Case was discussed with orthopedic physician assistant and also reviewed with Dr She Mercado early in the morning  Subjective:     Patient seen and examined today  EMR and overnight events reviewed  Patient is resting comfortably she was initially seen when she was sitting on the side of her bed  She was fully coherent and awake without any difficulty  She indicated that when she stood up this morning 1st time out of bed she had severe pain in the operated surgery site and then she felt a little woozy but never had episode where she passed out or had blackout or gray outs  She did not have any anginal chest pain etc   She is aware about her low hemoglobin which was told to her by the nursing staff  She has not made up her mind but then after discussion was very clear on on getting blood transfusion  She denies any fever chills denies any dysuria denies any bleeding denies any leg pain denies any swelling in her lower extremities  She has no dysphoric mood, denies any spontaneous bruising denies any skin rashes no dysuria no frequency  No abdominal pain no nausea no vomiting  All other systems reviewed and are negative  OBJECTIVE:     Vitals:     Temp:  [97 2 °F (36 2 °C)-98 7 °F (37 1 °C)] 97 6 °F (36 4 °C)  HR:  [58-91] 91  Resp:  [16-20] 18  BP: ()/(58-71) 113/59  SpO2:  [96 %-100 %] 99 %  Temp (24hrs), Av 8 °F (36 6 °C), Min:97 2 °F (36 2 °C), Max:98 7 °F (37 1 °C)  Current: Temperature: 97 6 °F (36 4 °C)    Intake/Output Summary (Last 24 hours) at 2019 1024  Last data filed at 2019 0601  Gross per 24 hour   Intake 1200 ml   Output 3650 ml   Net -2450 ml     Body mass index is 30 14 kg/m²        Physical Exam    Awake and alert cooperative  Pallor is 1+  JVP is not elevated  No cyanosis or icterus  Bilateral lungs are clear no rales or crackles  Regular S1-S2 no murmurs  Abdomen is soft and nontender  Neurologically intact without any focal deficit  Mild limited range of motion at the right hip to flexion    No discharge no redness no erythema no bruising noted on the site of surgery  Distal pulses within normal limit 1+ and posterior tibialis dorsalis pedis  Skin dry and warm skin turgor is normal no icterus/jaundice/rashes  Mood is fair and neutral      Labs, Imaging, & Other studies:    All pertinent labs and imaging studies were personally reviewed  Results from last 7 days   Lab Units 08/17/19  0453 08/16/19  1039   WBC Thousand/uL  --  5 71   HEMOGLOBIN g/dL 7 3* 8 2*   PLATELETS Thousands/uL  --  182     Results from last 7 days   Lab Units 08/17/19  0453   POTASSIUM mmol/L 4 4   CHLORIDE mmol/L 111*   CO2 mmol/L 27   BUN mg/dL 14   CREATININE mg/dL 0 82   EGFR ml/min/1 73sq m 81   CALCIUM mg/dL 8 4           Current Meds:  Current Facility-Administered Medications   Medication Dose Route Frequency Provider Last Rate Last Dose    acetaminophen (TYLENOL) tablet 650 mg  650 mg Oral Q6H PRN Katharina Harrington PA-C        ascorbic acid (VITAMIN C) tablet 250 mg  250 mg Oral Daily Mike Rios MD   250 mg at 08/17/19 0936    aspirin (ECOTRIN LOW STRENGTH) EC tablet 81 mg  81 mg Oral BID Katharina Harrington PA-C   81 mg at 08/17/19 0936    calcium carbonate (TUMS) chewable tablet 1,000 mg  1,000 mg Oral Daily PRN Katharina Harrington PA-C        celecoxib (CeleBREX) capsule 200 mg  200 mg Oral Daily Mark Anthony Correa PA-C   200 mg at 08/17/19 8735    ferrous sulfate tablet 325 mg  325 mg Oral Daily With Breakfast Mike Rios MD   325 mg at 08/17/19 0736    HYDROmorphone (DILAUDID) injection 0 2 mg  0 2 mg Intravenous Q3H PRN Katharina Harrington PA-C        ketorolac (TORADOL) injection 30 mg  30 mg Intravenous Q6H PRN Katharina Harrington PA-C   30 mg at 08/16/19 1440    lactated ringers bolus 1,000 mL  1,000 mL Intravenous Once PRN Katharina Harrington PA-C 1,000 mL/hr at 08/16/19 1030 1,000 mL at 08/16/19 1030    And    lactated ringers bolus 1,000 mL  1,000 mL Intravenous Once PRN Katharina Harrington PA-C  lactated ringers infusion  125 mL/hr Intravenous Continuous Rosita Cano, PA-C 125 mL/hr at 08/17/19 0918 125 mL/hr at 08/17/19 0918    methocarbamol (ROBAXIN) tablet 500 mg  500 mg Oral Q6H PRN Rosita Cano, PA-C        multivitamin-minerals (CENTRUM) tablet 1 tablet  1 tablet Oral Daily Rosita Cano, PA-C   1 tablet at 08/17/19 0936    ondansetron (ZOFRAN) injection 4 mg  4 mg Intravenous Q6H PRN Rosita Cano, PA-C        oxyCODONE (ROXICODONE) immediate release tablet 10 mg  10 mg Oral Q4H PRN Rosita Cano, PA-C        oxyCODONE (ROXICODONE) IR tablet 5 mg  5 mg Oral Q4H PRN Rosita Cano, PA-C        pantoprazole (PROTONIX) EC tablet 40 mg  40 mg Oral Daily FLOR Blanco-C   40 mg at 08/17/19 1890    senna (SENOKOT) tablet 8 6 mg  1 tablet Oral Daily Rosita Cano PA-C   8 6 mg at 08/17/19 1695    sodium chloride 0 9 % bolus 1,000 mL  1,000 mL Intravenous Once PRN Rosita Cano PA-C        And    sodium chloride 0 9 % bolus 1,000 mL  1,000 mL Intravenous Once PRN Rosita Cano, PA-C        sodium chloride 0 9 % infusion  125 mL/hr Intravenous Continuous Aren Glasgow  mL/hr at 08/16/19 1200 125 mL/hr at 08/16/19 1200    traMADol (ULTRAM) tablet 50 mg  50 mg Oral Q6H PRN Rosita Cano, PA-C   50 mg at 08/17/19 6643     Home Meds:  Medications Prior to Admission   Medication    mupirocin (BACTROBAN) 2 % ointment       Continuous Infusions:    lactated ringers 125 mL/hr Last Rate: 125 mL/hr (08/17/19 0918)   sodium chloride 125 mL/hr Last Rate: 125 mL/hr (08/16/19 1200)       Invasive Devices     Peripheral Intravenous Line            Peripheral IV 08/16/19 Left Wrist 1 day                VTE Pharmacologic Prophylaxis: ASA as per Primary Ortho Team   VTE Mechanical Prophylaxis: sequential compression device    Portions of the record may have been created with voice recognition software   Occasional wrong word or "sound a like" substitutions may have occurred due to the inherent limitations of voice recognition software  Read the chart carefully and recognize, using context, where substitutions have occurred

## 2019-08-17 NOTE — PLAN OF CARE
Problem: PAIN - ADULT  Goal: Verbalizes/displays adequate comfort level or baseline comfort level  Description  Interventions:  - Encourage patient to monitor pain and request assistance  - Assess pain using appropriate pain scale  - Administer analgesics based on type and severity of pain and evaluate response  - Implement non-pharmacological measures as appropriate and evaluate response  - Consider cultural and social influences on pain and pain management  - Notify physician/advanced practitioner if interventions unsuccessful or patient reports new pain  Outcome: Progressing     Problem: INFECTION - ADULT  Goal: Absence or prevention of progression during hospitalization  Description  INTERVENTIONS:  - Assess and monitor for signs and symptoms of infection  - Monitor lab/diagnostic results  - Monitor all insertion sites, i e  indwelling lines, tubes, and drains  - Monitor endotracheal if appropriate and nasal secretions for changes in amount and color  - [place] appropriate cooling/warming therapies per order  - Administer medications as ordered  - Instruct and encourage patient and family to use good hand hygiene technique  - Identify and instruct in appropriate isolation precautions for identified infection/condition  Outcome: Progressing     Problem: SAFETY ADULT  Goal: Patient will remain free of falls  Description  INTERVENTIONS:  - Assess patient frequently for physical needs  -  Identify cognitive and physical deficits and behaviors that affect risk of falls    -  [place] fall precautions as indicated by assessment   - Educate patient/family on patient safety including physical limitations  - Instruct patient to call for assistance with activity based on assessment  - Modify environment to reduce risk of injury  - Consider OT/PT consult to assist with strengthening/mobility  Outcome: Progressing     Problem: DISCHARGE PLANNING  Goal: Discharge to home or other facility with appropriate resources  Description  INTERVENTIONS:  - Identify barriers to discharge w/patient and caregiver  - Arrange for needed discharge resources and transportation as appropriate  - Identify discharge learning needs (meds, wound care, etc )  - Arrange for interpretive services to assist at discharge as needed  - Refer to Case Management Department for coordinating discharge planning if the patient needs post-hospital services based on physician/advanced practitioner order or complex needs related to functional status, cognitive ability, or social support system  Outcome: Progressing     Problem: Knowledge Deficit  Goal: Patient/family/caregiver demonstrates understanding of disease process, treatment plan, medications, and discharge instructions  Description  Complete learning assessment and assess knowledge base    Interventions:  - Provide teaching at level of understanding  - Provide teaching via preferred learning methods  Outcome: Progressing     Problem: COPING  Goal: Pt/Family able to verbalize concerns and demonstrate effective coping strategies  Description  INTERVENTIONS:  - Assist patient/family to identify coping skills, available support systems and cultural and spiritual values  - Provide emotional support, including active listening and acknowledgement of concerns of patient and caregivers  - Reduce environmental stimuli, as able  - Provide patient education  - Assess for spiritual pain/suffering and initiate spiritual care, including notification of Pastoral Care or [place] based community as needed  - Assess effectiveness of coping strategies  Outcome: Progressing     Problem: GENITOURINARY - ADULT  Goal: Absence of urinary retention  Description  INTERVENTIONS:  - Assess patients ability to void and empty bladder  - Monitor I/O  - Bladder scan as needed  - Discuss with physician/AP medications to alleviate retention as needed  - Discuss catheterization for long term situations as appropriate  Outcome: Progressing  Goal: Urinary catheter remains patent  Description  INTERVENTIONS:  - Assess patency of urinary catheter  - If patient has a chronic jefferson, consider changing catheter if non-functioning  - Follow guidelines for intermittent irrigation of non-functioning urinary catheter  Outcome: Progressing     Problem: HEMATOLOGIC - ADULT  Goal: Maintains hematologic stability  Description  INTERVENTIONS  - Assess for signs and symptoms of bleeding or hemorrhage  - Monitor labs  - Administer supportive blood products/factors as ordered and appropriate  Outcome: Progressing

## 2019-08-17 NOTE — PLAN OF CARE
Problem: PHYSICAL THERAPY ADULT  Goal: Performs mobility at highest level of function for planned discharge setting  See evaluation for individualized goals  Description  Treatment/Interventions: Functional transfer training, LE strengthening/ROM, Therapeutic exercise, Endurance training, Patient/family training, Equipment eval/education, Bed mobility, Gait training, Spoke to nursing          See flowsheet documentation for full assessment, interventions and recommendations  Outcome: Progressing  Note:   Prognosis: Good  Problem List: Decreased strength, Decreased range of motion, Decreased endurance, Impaired balance, Decreased mobility, Decreased skin integrity, Orthopedic restrictions, Pain  Assessment: Pt seen for PT interventions of bed mobility, transfer and gait training, initiation of HEP, pt education and endurance and activity tolerance training  Pt performed supine THR exercises x 10 reps for THR pt requires assistance to perform hip abd /add  Exercises  Pt performs supine to sit with supervision increased time and verbal cues for technique  Pt performs sit to stand and stand to sit with min assist x1 and verbal cues for technique  Pt initially reported feeling dizzy with pale color and only able to amb 4' x2, then sat Bp 110/ 56 post 3 minutes amb  Pt stood again with min assist x1 bp 104/51, pt progressed with ambulation to 8' with min assist x1 and remained seated in chair with bp at 104/53 no reports of dizziness at this time  SCD's applied to b/l le's  PT eating breakfast at conclusion of PT session  Anticipate pt to progress well and achieve PT goals for safe d/c to home alone with family support and A PRN and HHPT progressing OPPT when appropriate        Barriers to Discharge Comments: Based on pt ability to meet rehab goals established  Recommendation: Home PT, Home with family support, Outpatient PT     PT - OK to Discharge: No(needs toprogress c mobilty, stair climbng to achieve PT goal)    See flowsheet documentation for full assessment  Problem: PHYSICAL THERAPY ADULT  Goal: Performs mobility at highest level of function for planned discharge setting  See evaluation for individualized goals  Description  Treatment/Interventions: Functional transfer training, LE strengthening/ROM, Therapeutic exercise, Endurance training, Patient/family training, Equipment eval/education, Bed mobility, Gait training, Spoke to nursing          See flowsheet documentation for full assessment, interventions and recommendations  Outcome: Progressing  Note:   Prognosis: Good  Problem List: Decreased strength, Decreased range of motion, Decreased endurance, Impaired balance, Decreased mobility, Decreased skin integrity, Orthopedic restrictions, Pain  Assessment: Pt seen for PT interventions of bed mobility, transfer and gait training, initiation of HEP, pt education and endurance and activity tolerance training  Pt performed supine THR exercises x 10 reps for THR pt requires assistance to perform hip abd /add  Exercises  Pt performs supine to sit with supervision increased time and verbal cues for technique  Pt performs sit to stand and stand to sit with min assist x1 and verbal cues for technique  Pt initially reported feeling dizzy with pale color and only able to amb 4' x2, then sat Bp 110/ 56 post 3 minutes amb  Pt stood again with min assist x1 bp 104/51, pt progressed with ambulation to 8' with min assist x1 and remained seated in chair with bp at 104/53 no reports of dizziness at this time  SCD's applied to b/l le's  PT eating breakfast at conclusion of PT session  Anticipate pt to progress well and achieve PT goals for safe d/c to home alone with family support and A PRN and HHPT progressing OPPT when appropriate        Barriers to Discharge Comments: Based on pt ability to meet rehab goals established  Recommendation: Home PT, Home with family support, Outpatient PT     PT - OK to Discharge: No(needs toprogress c mobilty, stair climbng to achieve PT goal)    See flowsheet documentation for full assessment

## 2019-08-17 NOTE — CONSULTS
Consultation - Internal Medicine  Penny Lopez 53 y o  female MRN: [id number]  Unit/Bed#: E2 -01 Encounter: [id number]      Impression :-    3 76-year-old female status post right total hip replacement earlier today by Dr Inge Luis  2  Advanced end-stage DJD right hip failed conservative treatments  3  Previous history of left knee replacement, 09/02/2014  4  Acute blood loss anemia, hemoglobin 8 2  5  Mild clinical volume depletion due to poor intake  6  Postoperative indwelling Medina catheter  7  Left knee patellofemoral DJD  8  Obesity with BMI 30 8 outpatient     Recommendation: -    Continue postoperative care as recommended by Dr Inge Luis  Patient had acute blood loss anemia but remains completely asymptomatic  She has not keen on any blood transfusions at present  She has agreed to have her hemoglobin rechecked again in the morning and then revisit and consider if she would need any blood  We will start her on ferrous sulfate and ascorbic acid and if she has any symptoms particularly with any chest tightness dizziness lightheadedness tachycardia then would recheck her labs immediately and consider transfusion accordingly  Patient clinically appears to be volume depleted and would recommend continuation of her IV fluids and also enhance oral intake of fluids  I have reviewed patients medications as initiated on post operative orders by the primary team  Recommend  monitoring closely for any hypoxemia / respiratory insufficieny related to narcotics and to reduce doses and frequency of narcotics if necessary  Maintain Intravenous Fluids for next 24 -48 hours, till patient able to reliably keep meals and meds in  Suggest  Zofran ODT 4 mg sublingually PRN for control of post operative nausea  Patient encouraged to  use Incentive spirometry q 15 minutes while awake to minimize risk of postoperative atelectasis and pneumonia  Patient verbalized to understand and fully comprehend      Recommend DVT prophylaxis with use of Venodyne compression boots  If any factor X A inhibitor,  low molecule weight heparin or Coumadin is planned by the primary team, we will be happy to dose that  drug based on patient's hemostasis  81 mg b i d  ASA as antiplatelet drug per Ortho  Team  Use Proton Pump inhibitor to minimize risk of gastritis  Monitor for any tinnitus as an early sign of salicylism and check salicylate levels in that situation  Discontinue patient's Medina catheter within next 24-48 hours in order  to minimize risk of catheter associated UTI  Please check patient's hemoglobin and hematocrit within next 24 hours to ensure that there is no acute blood loss anemia which would need any blood transfusion  We will follow this pleasant patient with your service closely and recommend necessary changes based on  further hospital course and diagnostics  Thank you, Dr Evi Soliman , for your kind consultation  HISTORY OF PRESENT ILLNESS:    Reason for Consult:  Post operative management following elective right hip replacement    HPI: Kaylie Valencia is a 53 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[place], has been having problem with her right hip particularly with pain for more than a year  Pain has been well localized in her groin and to the lateral aspect of her hip  This has been radiating down towards her gluteal area to the posterior aspect of her thigh  This has been causing her to have antalgic gait and stiffness  Patient initially had pain intermittently but as the time progressed over last 6 months or so her pain became more constant throbbing and would not improved with pain medications like naproxen Advil etc   She tried physical therapy in the form of walking program, riding bike which did not improve her situation  Patient was seen by Dr Evi Soliman and was noted to have tenderness and crepitus even with passive range of motion    Patient indicates that even simple activities like prolonged sitting and standing were becoming hard  She was having difficulty with sleeping hand this was affecting her quality of life  Patient had previous left total hip replacement in August of 2014 which gave her excellent relief  Patient has suffered with left knee pain and was treated in November of 2018 at the [facility] urgent care for patellar subluxation and subsequently saw Dr Annabel Aschoff at [address] [place] [address] who did aspiration  Patient also followed with Dr Julio Hilario as outpatient and had imaging studies done  She had x-rays done on 06/03/2019 where it was noted that she had severe joint narrowing in the right hip with osteophyte formation and subchondral sclerosis  No fracture or dislocations were noted  Based on her clinical findings and radiographic evaluation she was given a diagnosis of right hip severe DJD and was given option either to continue conservative treatments which in fact had not worked very well for her or to consider more definitive hip replacement  She consented to undergoing surgery with known inherent risks and complications  Currently she is doing well when I came to see her she was watching television and was on her phone  She states that her pain is currently 2/10 on numeric pain scale well localized in the anterior aspect of her hip  She had done some walking and kept her ice packs away from her bedside  She has no bleeding denies any spasm in her legs  Denies any swelling in her legs denies any numbness or tingling at this time  Postoperatively her hemoglobin was found to be low but fortunately she had no symptoms of dizziness lightheadedness syncope get aorta blackouts  She indicates of having issues with anemia in the past and had a colonoscopy and states that she was not told of any problems  She is followed by her primary care provider in [address] [place] [address] and had detailed preoperative workup        Review of Systems   Constitutional:  No chills, diaphoresis, fatigue or fever  HEENT:  Negative sore throat and tinnitus  No visual complaints  Respiratory:  Negative cough, shortness of breath and wheezing  Cardiovascular:  Negative for chest pain and palpitations  Gastrointestinal: Negative for abdominal distention or pain and nausea  Genitourinary:                Negative for  dysuria, flank pain  Tolerating Medina without difficulty  Skin:   Negative for color change, pallor and rash  Neurological:   Negative for dizziness, tremors, seizures, syncope, facial asymmetry   Hematological:  Musculoskeletal:  Psychiatric:  No h/o spontaneous bruising/bleeding  Joint pains as above  Negative for agitation, behavioral problems      A complete system-based review of systems as discussed with patient is otherwise negative  PAST MEDICAL HISTORY:  Past Medical History:   Diagnosis Date    Arthritis     hands    Contact lens overwear of both eyes     Wears glasses      Past Surgical History:   Procedure Laterality Date    BONE MARROW ASPIRATION      CHOLECYSTECTOMY      HAND SURGERY Left     thumb    HIP SURGERY Left 2014    GA COLONOSCOPY FLX DX W/COLLJ SPEC WHEN PFRMD N/A 5/7/2019    Procedure: COLONOSCOPY;  Surgeon: Braden Ramirez MD;  Location: [facility]; Service: Gastroenterology    TONSILLECTOMY      TYMPANOSTOMY TUBE PLACEMENT         FAMILY HISTORY:  History of malignancy in both parents    SOCIAL HISTORY:  Social History     Substance and Sexual Activity   Alcohol Use Yes    Frequency: Monthly or less     Social History     Substance and Sexual Activity   Drug Use No     Social History     Tobacco Use   Smoking Status Never Smoker   Smokeless Tobacco Never Used       ALLERGIES:  Allergies   Allergen Reactions    Amoxicillin Rash     Reaction Date: [id number];     Azithromycin Rash     Reaction Date: [id number];        MEDICATIONS:  All current active medications have been reviewed    Current Facility-Administered Medications   Medication Dose Route Frequency Provider Last Rate Last Dose    acetaminophen (TYLENOL) tablet 650 mg  650 mg Oral Q6H PRN Stanley Ruiz PA-C        [START ON 8/17/2019] ascorbic acid (VITAMIN C) tablet 250 mg  250 mg Oral Daily Shruthi Burch MD        aspirin (ECOTRIN LOW STRENGTH) EC tablet 81 mg  81 mg Oral BID JUAN PABLO SaldanaC   81 mg at 08/16/19 1843    calcium carbonate (TUMS) chewable tablet 1,000 mg  1,000 mg Oral Daily PRN Stanley Ruiz PA-C        ceFAZolin (ANCEF) IVPB (premix) 1,000 mg  1,000 mg Intravenous Q8H Mark Anthony Correa PA-C 100 mL/hr at 08/16/19 1435 1,000 mg at 08/16/19 1435    celecoxib (CeleBREX) capsule 200 mg  200 mg Oral Daily JUAN PABLO SaldanaC   200 mg at 08/16/19 1444    [START ON 8/17/2019] ferrous sulfate tablet 325 mg  325 mg Oral Daily With Breakfast Shruthi Burch MD        HYDROmorphone (DILAUDID) injection 0 2 mg  0 2 mg Intravenous Q3H PRN JUAN PABLO SaldanaC        ketorolac (TORADOL) injection 30 mg  30 mg Intravenous Q6H PRN JUAN PABLO SaldanaC   30 mg at 08/16/19 1440    lactated ringers bolus 1,000 mL  1,000 mL Intravenous Once PRN JUAN PABLO SaldanaC 1,000 mL/hr at 08/16/19 1030 1,000 mL at 08/16/19 1030    And    lactated ringers bolus 1,000 mL  1,000 mL Intravenous Once PRN JUAN PABLO SaldanaC        lactated ringers infusion  125 mL/hr Intravenous Continuous JUAN PABLO SaldanaC 125 mL/hr at 08/16/19 1449 125 mL/hr at 08/16/19 1449    methocarbamol (ROBAXIN) tablet 500 mg  500 mg Oral Q6H PRN JUAN PABLO SaldanaC        multivitamin-minerals (CENTRUM) tablet 1 tablet  1 tablet Oral Daily JUAN PABLO SaldanaC   1 tablet at 08/16/19 1443    ondansetron (ZOFRAN) injection 4 mg  4 mg Intravenous Q6H PRN Stanley Ruiz PA-C        oxyCODONE (ROXICODONE) immediate release tablet 10 mg  10 mg Oral Q4H PRN Stanley Ruiz PA-C        oxyCODONE (ROXICODONE) IR tablet 5 mg  5 mg Oral Q4H PRN Stanley Ruiz PA-C        pantoprazole (PROTONIX) EC tablet 40 mg  40 mg Oral Daily Stanley Ruiz, PA-C   40 mg at 19 1444    senna (SENOKOT) tablet 8 6 mg  1 tablet Oral Daily Angelo Estrella, PA-C   8 6 mg at 19 1448    sodium chloride 0 9 % bolus 1,000 mL  1,000 mL Intravenous Once PRN Angelo Majoret, PA-C        And    sodium chloride 0 9 % bolus 1,000 mL  1,000 mL Intravenous Once PRN Angelo Majoret, PA-C        sodium chloride 0 9 % infusion  125 mL/hr Intravenous Continuous Aren Glasgow,  mL/hr at 19 1200 125 mL/hr at 19 1200    traMADol (ULTRAM) tablet 50 mg  50 mg Oral Q6H PRN Angelo Estrella, PA-C           Medications Prior to Admission   Medication    mupirocin (BACTROBAN) 2 % ointment           PHYSICAL EXAM:    Vitals:  Temp:  [97 2 °F (36 2 °C)-98 7 °F (37 1 °C)] 97 9 °F (36 6 °C)  HR:  [58-87] 87  Resp:  [15-20] 16  BP: ()/() 112/66  SpO2:  [96 %-100 %] 100 %  Temp (24hrs), Av 6 °F (36 4 °C), Min:97 2 °F (36 2 °C), Max:98 7 °F (37 1 °C)  Current: Temperature: 97 9 °F (36 6 °C)  Body mass index is 29 41 kg/m²  General Appearance:    Awake, alert, cooperative, no distress, appears of stated age  Pallor +    No Icterus    Peripheries warm and well perfused  Head:    Normocephalic, without obvious abnormality, atraumatic   Eyes:    Pupils equal in size,conjunctiva/corneas clear, EOM's intact  Nose:   No evidence of epistaxis/ discharge from nares  Throat:   Lips, mucosa, and tongue normal    Neck:   Supple, symmetrical, trachea midline, no JVP  Back:     Symmetric, no curvature, no CVA tenderness   Lungs:     Bilateral air entry is broncho-alveolar and equal        No crepitation or rales  No pleural rub  Heart:    Regular rate and rhythm, S1S2 normal, no murmur, rub  Abdomen:     Soft, non-tender, no masses, no organomegaly    Central obesity 1+   Genitalia:   Indwelling Medina catheter  Clear urine +, No hematuria  Musculoskeletal:   Extremities appear normal, atraumatic, no cyanosis or edema   Surgical site on the operated right hip site has clear dressing / no bleeding or hemorrhage apparent  Vascular:   1+ in Posterior tibialis / dorsalis pedis  Skin:   Skin color, texture, turgor normal, no rashes or lesions   Neurologic:   Oriented/ Awake/ facial symmetry maintained  Speech is intact  Muscle bulk and strength is equivocal in B/L Upper and lower extremities except on operated limb  Light sensation is intact B/l LE  B/L Planta flexion is WNL  LABS, IMAGING, & OTHER STUDIES:  Lab Results:  I have personally reviewed pertinent labs  Lab Results   Component Value Date     08/30/2014     07/29/2019    CO2 29 07/29/2019    ANIONGAP 6 08/30/2014    BUN 16 07/29/2019    CREATININE 0 88 07/29/2019    EGFR 74 07/29/2019    GLUCOSE 139 08/30/2014    CALCIUM 9 3 07/29/2019    AST 19 07/29/2019    ALT 20 07/29/2019    ALKPHOS 102 07/29/2019    PROT 8 1 08/12/2014    BILITOT 0 8 08/12/2014     Lab Results   Component Value Date    WBC 5 71 08/16/2019    WBC 4 23 (L) 07/29/2019    WBC 5 57 08/12/2014    HGB 8 2 (L) 08/16/2019    HGB 12 3 07/29/2019    HGB 10 3 (L) 08/30/2014     08/16/2019     07/29/2019     08/12/2014       Lab Results   Component Value Date    INR 0 94 08/12/2014         Imaging Studies:   I have personally reviewed pertinent imaging study reports  Imaging:     Xr Chest Pa & Lateral    Result Date: 8/5/2019  Narrative: CHEST INDICATION:   M16 11: Unilateral primary osteoarthritis, right hip Z01 818: Encounter for other preprocedural examination  Preoperative clearance  COMPARISON:  Chest radiographs August 12, 2014 EXAM PERFORMED/VIEWS:  XR CHEST PA & LATERAL Images: 2 FINDINGS: Heart shadow appears unremarkable  Atherosclerotic vascular calcifications are noted  The lungs are clear  No pneumothorax or pleural effusion  Osseous structures appear within normal limits for patient age  Impression: No acute cardiopulmonary disease   Workstation performed: [id number]     Xr Hip/pelv 1 Vw Right Result Date: 8/16/2019  Narrative: C-ARM - intraoperative fluoroscopic guidance for right hip replacement  INDICATION: M16 11: Unilateral primary osteoarthritis, right hip  Procedure guidance  COMPARISON:  8/29/2014 TECHNIQUE: FLUOROSCOPY TIME:   28 seconds 5 FLUOROSCOPIC IMAGES FINDINGS: Fluoroscopic guidance provided for surgical procedure  Initial  view demonstrates moderate to advanced degenerative changes in the right hip  Subsequent right hip arthroplasty is noted  Previously present left hip replacement noted  Osseous and soft tissue detail limited by technique  Impression: Fluoroscopic guidance provided for surgical procedure  Please refer to the separate procedure notes for additional details  Workstation performed: [id number]     Xr Hip/pelv 1 Vw Right     Result Date: 8/16/2019  Narrative: RIGHT HIP INDICATION:   post op  COMPARISON:  None VIEWS:  XR HIP/PELV 1 VW RIGHT  W PELVIS IF PERFORMED FINDINGS: There is no acute fracture or dislocation  Right total hip arthroplasty is identified in satisfactory position without evidence of hardware complication  No lytic or blastic osseous lesions  There are postsurgical changes noted with skin staples  The visualized lumbar spine is unremarkable  Impression: Status post right hip arthroplasty with postsurgical changes  Alignment is anatomic  Workstation performed: [id number]       EKG, Pathology, and Other Studies:   I have personally reviewed pertinent reports  Portions of the record may have been created with voice recognition software  Occasional wrong word or "sound a like" substitutions may have occurred due to the inherent limitations of voice recognition software  Read the chart carefully and recognize, using context, where substitutions have occurred

## 2019-08-17 NOTE — PHYSICAL THERAPY NOTE
Physical Therapy Treatment Note     08/17/19 0845   Pain Assessment   Pain Assessment 0-10   Pain Score 5   Pain Type Surgical pain   Pain Location Hip   Pain Orientation Right   Pain Descriptors   (stinging)   Effect of Pain on Daily Activities increased pain with activity  Patient's Stated Pain Goal No pain   Hospital Pain Intervention(s) Repositioned; Ambulation/increased activity; Emotional support   Response to Interventions tolerated   Restrictions/Precautions   RLE Weight Bearing Per Order WBAT   General   Chart Reviewed Yes   Family/Caregiver Present No   Cognition   Overall Cognitive Status WFL   Arousal/Participation Alert; Responsive; Cooperative   Attention Within functional limits   Orientation Level Oriented X4   Memory Within functional limits   Following Commands Follows all commands and directions without difficulty   Subjective   Subjective " It hurts more than the other one  it feels like a stinging pain "     Bed Mobility   Supine to Sit 5  Supervision   Additional items Assist x 1;HOB elevated; Bedrails; Increased time required;Verbal cues   Transfers   Sit to Stand 4  Minimal assistance   Additional items Assist x 1; Armrests; Increased time required;Verbal cues   Stand to Sit 4  Minimal assistance   Additional items Assist x 1; Armrests; Increased time required;Verbal cues   Ambulation/Elevation   Gait pattern Narrow MERCEDES; Forward Flexion;Decreased R stance;Decreased L stance; Step to;Excessively slow; Short stride  (toes in L le  )   Gait Assistance 4  Minimal assist   Additional items Assist x 1;Verbal cues   Assistive Device Rolling walker   Distance 4' forward, 4' backward, 8' x1   Balance   Static Sitting Good   Dynamic Sitting Fair +   Static Standing Fair -   Dynamic Standing Poor +   Ambulatory Poor +   Endurance Deficit   Endurance Deficit Yes  (lightheadedness, dizziness, fatigue,pain)   Activity Tolerance   Activity Tolerance Patient limited by pain; Patient limited by fatigue   Medical Staff Made Aware Kourtney MCRAE   Nurse Made Aware yes   Exercises   THR Supine;10 reps;AROM; Bilateral;AAROM  (omitting SLR)   Equipment Use   Comments PT issued written THR HEP, reviewed and performed with pt  Assessment   Prognosis Good   Problem List Decreased strength;Decreased range of motion;Decreased endurance; Impaired balance;Decreased mobility; Decreased skin integrity;Orthopedic restrictions;Pain   Assessment Pt seen for PT interventions of bed mobility, transfer and gait training, initiation of HEP, pt education and endurance and activity tolerance training  Pt performed supine THR exercises x 10 reps for THR pt requires assistance to perform hip abd /add  Exercises  Pt performs supine to sit with supervision increased time and verbal cues for technique  Pt performs sit to stand and stand to sit with min assist x1 and verbal cues for technique  Pt initially reported feeling dizzy with pale color and only able to amb 4' x2, then sat Bp 110/ 56 post 3 minutes amb  Pt stood again with min assist x1 bp 104/51, pt progressed with ambulation to 8' with min assist x1 and remained seated in chair with bp at 104/53 no reports of dizziness at this time  SCD's applied to b/l le's  PT eating breakfast at conclusion of PT session  Anticipate pt to progress well and achieve PT goals for safe d/c to home alone with family support and A PRN and HHPT progressing OPPT when appropriate  Goals   Patient Goals " to go home "     STG Expiration Date 08/23/19   Treatment Day 1   Plan   Treatment/Interventions Functional transfer training;LE strengthening/ROM; Therapeutic exercise; Endurance training;Patient/family training;Equipment eval/education; Bed mobility;Gait training;Spoke to nursing   Progress Slow progress, medical status limitations   PT Frequency Twice a day;7x/wk; Weekend   Recommendation   Recommendation Home PT; Home with family support; Outpatient PT   PT - OK to Discharge No  (needs toprogress c mobilty, stair climbng to achieve PT goal)         Oscar Pennington, PTA

## 2019-08-17 NOTE — PHYSICAL THERAPY NOTE
Physical Therapy Treatment Note       08/17/19 1519   Pain Assessment   Pain Assessment 0-10   Pain Score 5   Pain Type Surgical pain   Pain Location Hip   Pain Orientation Right   Pain Descriptors Burning  (stinging pain)   Pain Frequency Intermittent   Hospital Pain Intervention(s) Repositioned; Ambulation/increased activity; Emotional support   Response to Interventions tolerated   Restrictions/Precautions   RLE Weight Bearing Per Order WBAT   General   Family/Caregiver Present No   Cognition   Overall Cognitive Status WFL   Subjective   Subjective " I'm still getting that stinging buring pain when I go to get up "    Bed Mobility   Supine to Sit 5  Supervision   Additional items Assist x 1;HOB elevated; Increased time required   Transfers   Sit to Stand 5  Supervision  (increased forward felxed posture with sit to stand)   Additional items Assist x 1; Increased time required;Verbal cues; Bedrails   Stand to Sit 5  Supervision   Additional items Assist x 1; Armrests; Increased time required;Verbal cues   Ambulation/Elevation   Gait pattern Short stride; Step to;Excessively slow; Foward flexed  (decreased heel strike R le)   Gait Assistance 5  Supervision  (close supervision)   Additional items Assist x 1;Verbal cues   Assistive Device Rolling walker   Distance 50' x2   Balance   Static Sitting Good   Static Standing Fair   Dynamic Standing Fair   Ambulatory Fair   Activity Tolerance   Activity Tolerance Patient limited by pain; Patient limited by fatigue;Patient tolerated treatment well   Exercises   Glute Sets Sitting;10 reps;AROM; Bilateral   Hip Flexion Sitting;10 reps;AAROM; Right   Hip Adduction Sitting;10 reps;AROM; Bilateral  (isometric hip add)   Knee AROM Long Arc Quad Sitting;10 reps;AROM; Bilateral   Ankle Pumps Sitting;10 reps;AROM; Bilateral   Assessment   Prognosis Good   Problem List Decreased strength;Decreased range of motion;Decreased endurance; Impaired balance;Decreased mobility;Pain;Orthopedic restrictions;Decreased skin integrity   Assessment Overall noted imprroed ability to perform bed mobility, transfers and ambulation on levels with use of rw  Less assistance for all mobility  Pt progressed with ambulation distances to 48' x2 with close supervision  Poor R heel contact with heel strike and stance phase of gait  Pt performs seated R le arom exercises with assistance for R le hip flexion requiring tp to self assist   Pt remained seated out of bed in chair post PT session  Call bell issued to pt  PT will benefit from continued inpt PT for progression of mobility with ambulation distances and stair climbing and pt education as appropriate  Recommend d/c to home with HHPT and family support and assistance when PT goals met  Will progress to stair climbing next session  Goals   Patient Goals " to go home "     STG Expiration Date 08/23/19   Treatment Day 2   Plan   Treatment/Interventions Functional transfer training;LE strengthening/ROM; Therapeutic exercise; Endurance training;Patient/family training;Equipment eval/education; Bed mobility;Gait training   Progress Progressing toward goals   PT Frequency Twice a day;7x/wk; Weekend   Recommendation   Recommendation Home PT; Home with family support; Outpatient PT   PT - OK to Discharge No  (needs stair training  )        08/17/19 1519   Pain Assessment   Pain Assessment 0-10   Pain Score 5   Pain Type Surgical pain   Pain Location Hip   Pain Orientation Right   Pain Descriptors Burning  (stinging pain)   Pain Frequency Intermittent   Hospital Pain Intervention(s) Repositioned; Ambulation/increased activity; Emotional support   Response to Interventions tolerated   Restrictions/Precautions   RLE Weight Bearing Per Order WBAT   General   Family/Caregiver Present No   Cognition   Overall Cognitive Status WFL   Subjective   Subjective " I'm still getting that stinging buring pain when I go to get up "    Bed Mobility   Supine to Sit 5  Supervision Additional items Assist x 1;HOB elevated; Increased time required   Transfers   Sit to Stand 5  Supervision  (increased forward felxed posture with sit to stand)   Additional items Assist x 1; Increased time required;Verbal cues; Bedrails   Stand to Sit 5  Supervision   Additional items Assist x 1; Armrests; Increased time required;Verbal cues   Ambulation/Elevation   Gait pattern Short stride; Step to;Excessively slow; Foward flexed  (decreased heel strike R le)   Gait Assistance 5  Supervision  (close supervision)   Additional items Assist x 1;Verbal cues   Assistive Device Rolling walker   Distance 50' x2   Balance   Static Sitting Good   Static Standing Fair   Dynamic Standing Fair   Ambulatory Fair   Activity Tolerance   Activity Tolerance Patient limited by pain; Patient limited by fatigue;Patient tolerated treatment well   Exercises   Glute Sets Sitting;10 reps;AROM; Bilateral   Hip Flexion Sitting;10 reps;AAROM; Right   Hip Adduction Sitting;10 reps;AROM; Bilateral  (isometric hip add)   Knee AROM Long Arc Quad Sitting;10 reps;AROM; Bilateral   Ankle Pumps Sitting;10 reps;AROM; Bilateral   Assessment   Prognosis Good   Problem List Decreased strength;Decreased range of motion;Decreased endurance; Impaired balance;Decreased mobility;Pain;Orthopedic restrictions;Decreased skin integrity   Assessment Overall noted imprroed ability to perform bed mobility, transfers and ambulation on levels with use of rw  Less assistance for all mobility  Pt progressed with ambulation distances to 48' x2 with close supervision  Poor R heel contact with heel strike and stance phase of gait  Pt performs seated R le arom exercises with assistance for R le hip flexion requiring tp to self assist   Pt remained seated out of bed in chair post PT session  Call bell issued to pt  PT will benefit from continued inpt PT for progression of mobility with ambulation distances and stair climbing and pt education as appropriate    Recommend d/c to home with HHPT and family support and assistance when PT goals met  Will progress to stair climbing next session  Goals   Patient Goals " to go home "     STG Expiration Date 08/23/19   Treatment Day 2   Plan   Treatment/Interventions Functional transfer training;LE strengthening/ROM; Therapeutic exercise; Endurance training;Patient/family training;Equipment eval/education; Bed mobility;Gait training   Progress Progressing toward goals   PT Frequency Twice a day;7x/wk; Weekend   Recommendation   Recommendation Home PT; Home with family support; Outpatient PT   PT - OK to Discharge No  (needs stair training  )       Erwin Leon, PTA

## 2019-08-17 NOTE — PLAN OF CARE
Problem: PHYSICAL THERAPY ADULT  Goal: Performs mobility at highest level of function for planned discharge setting  See evaluation for individualized goals  Description  Treatment/Interventions: Functional transfer training, LE strengthening/ROM, Therapeutic exercise, Endurance training, Patient/family training, Equipment eval/education, Bed mobility, Gait training          See flowsheet documentation for full assessment, interventions and recommendations  8/17/2019 1726 by Jaiden Shaw PTA  Outcome: Progressing  Note:   Prognosis: Good  Problem List: Decreased strength, Decreased range of motion, Decreased endurance, Impaired balance, Decreased mobility, Pain, Orthopedic restrictions, Decreased skin integrity  Assessment: Overall noted imprroed ability to perform bed mobility, transfers and ambulation on levels with use of rw  Less assistance for all mobility  Pt progressed with ambulation distances to 48' x2 with close supervision  Poor R heel contact with heel strike and stance phase of gait  Pt performs seated R le arom exercises with assistance for R le hip flexion requiring tp to self assist   Pt remained seated out of bed in chair post PT session  Call bell issued to pt  PT will benefit from continued inpt PT for progression of mobility with ambulation distances and stair climbing and pt education as appropriate  Recommend d/c to home with HHPT and family support and assistance when PT goals met  Will progress to stair climbing next session  Barriers to Discharge Comments: Based on pt ability to meet rehab goals established  Recommendation: Home PT, Home with family support, Outpatient PT     PT - OK to Discharge: No(needs stair training  )    See flowsheet documentation for full assessment

## 2019-08-17 NOTE — UTILIZATION REVIEW
Initial Clinical Review    Elective IP  surgical procedure  Age/Sex: [age] [address][age] Green y o  female  Surgery Date: 8/16/2019  Procedure: Right Hip Replacement   Anesthesia: Spinal    Admission Orders: Date/Time/Statement: 8/16/19 @ 1005   Orders Placed This Encounter   Procedures    Inpatient Admission     Standing Status:   Standing     Number of Occurrences:   1     Order Specific Question:   Admitting Physician     Answer:   Kiersten Conde     Order Specific Question:   Level of Care     Answer:   Med Surg [16]     Order Specific Question:   Estimated length of stay     Answer:   Inpatient Only Surgery     Vital Signs: /57 (BP Location: Right arm)   Pulse 91   Temp 98 °F (36 7 °C) (Temporal)   Resp 18   Ht 5' 4 5" (1 638 m)   Wt 80 9 kg (178 lb 5 6 oz)   SpO2 99%   BMI 30 14 kg/m²       08/17/19 1109  98 °F (36 7 °C)  91  18 109/57 99 % None (Room air)   08/17/19 0759  97 6 °F (36 4 °C)  91  18 113/59 99 % None (Room air)   08/16/19 2300  98 7 °F (37 1 °C)  77  16 98/60 99 % None (Room air)   08/16/19 1900  97 9 °F (36 6 °C)  87  16 112/66 100 % None (Room air)   08/16/19 1500  97 5 °F (36 4 °C)  77  18 116/63 99 % --       Basic metabolic panel    Ref Range & Units 8/17/19 0453   Sodium 136 - 145 mmol/L 144    Potassium 3 5 - 5 3 mmol/L 4 4    Chloride 100 - 108 mmol/L 111High     CO2 21 - 32 mmol/L 27    ANION GAP 4 - 13 mmol/L 6    BUN 5 - 25 mg/dL 14    Creatinine 0 60 - 1 30 mg/dL 0 82    Glucose 65 - 140 mg/dL 98    Calcium 8 3 - 10 1 mg/dL 8 4    eGFR ml/min/1 73sq m 81             Ref Range & Units 8/17/19 0453 8/16/19 1039   Hemoglobin 11 5 - 15 4 g/dL 7 3Low   8 2Low     Hematocrit 34 8 - 46 1 % 23 2Low   26 1Low                 8/17 POD #1  Status post-Right total hip arthroplasty  Acute blood loss anemia, hemoglobin 8 2 -->>7 3 with episode of lightheadedness this am  Pt refusing Bloos TX @ present & requests to wait till tomorrow to recheck her hemoglobin  Continue pain control, close clinical monitoring and reassess within next 24 hours  Patient is not ready for discharge  Diet: REGULAR  Mobility:  WBAT ; ambuate q shift starting POD #1  DVT Prophylaxis: SCD's  Medications/Pain Control:   Current Facility-Administered Medications:  acetaminophen 650 mg Oral Q8H [facility]    ascorbic acid 250 mg Oral Daily    aspirin 81 mg Oral BID    calcium carbonate 1,000 mg Oral Daily PRN    celecoxib 200 mg Oral Daily    docusate sodium 50 mg Oral Daily    ferrous sulfate 325 mg Oral BID With Meals    HYDROmorphone 0 2 mg Intravenous Q3H PRN    ketorolac 30 mg Intravenous Q6H PRN  IV x 1 8/16   lactated ringers 125 mL/hr Intravenous Continuous    methocarbamol 500 mg Oral Q6H PRN    multivitamin-minerals 1 tablet Oral Daily    ondansetron 4 mg Intravenous Q6H PRN    oxyCODONE 10 mg Oral Q4H PRN    oxyCODONE 5 mg Oral Q4H PRN    pantoprazole 40 mg Oral Daily    senna 1 tablet Oral Daily    traMADol 50 mg Oral Q6H PRN  x 1 8/17       Network Utilization Review Department  Phone: [phone]; Fax [phone]  [email or web address]  ATTENTION: Please call with any questions or concerns to [phone]  and carefully listen to the prompts so that you are directed to the right person  Send all requests for admission clinical reviews, approved or denied determinations and any other requests to fax [phone]   All voicemails are confidential

## 2019-08-17 NOTE — PROGRESS NOTES
Orthopedic Total Hip Progress Note      Subjective     Status post-Right total hip arthroplasty  Systemic or Specific Complaints: No Complaints    Objective     Vital signs in last 24 hours:  Temp:  [97 5 °F (36 4 °C)-98 7 °F (37 1 °C)] 98 °F (36 7 °C)  HR:  [70-91] 91  Resp:  [16-18] 18  BP: ()/(57-71) 109/57    Neurovascular: Capillary refill: Normal  Dorsiflexion: 5/5  Plantarflexion:  5/5   Wound: Dressing:  clean/dry/intact   DVT Exam: No evidence of DVT seen on physical exam   Negative Jeffery's sign  No cords or calf tenderness  No significant calf/ankle edema  Data Review:  CBC:   Lab Results   Component Value Date    WBC 5 71 08/16/2019    WBC 5 57 08/12/2014    RBC 2 70 (L) 08/16/2019    RBC 4 42 08/12/2014    HGB 7 3 (L) 08/17/2019    HGB 10 3 (L) 08/30/2014    HCT 23 2 (L) 08/17/2019    HCT 31 5 (L) 08/30/2014     08/16/2019     08/12/2014       Assessment/Plan     Status post-Right total hip arthroplasty: Doing well postoperatively      Continues current post-op course    Activity: up with assistance      Anjana Calero PA-C    Date: 8/17/2019  Time: 12:50 PM

## 2019-08-18 VITALS
DIASTOLIC BLOOD PRESSURE: 57 MMHG | OXYGEN SATURATION: 99 % | HEART RATE: 87 BPM | RESPIRATION RATE: 14 BRPM | WEIGHT: 178.79 LBS | SYSTOLIC BLOOD PRESSURE: 108 MMHG | HEIGHT: 65 IN | TEMPERATURE: 98.7 F | BODY MASS INDEX: 29.79 KG/M2

## 2019-08-18 LAB
ANION GAP SERPL CALCULATED.3IONS-SCNC: 6 MMOL/L (ref 4–13)
BUN SERPL-MCNC: 13 MG/DL (ref 5–25)
CALCIUM SERPL-MCNC: 8.3 MG/DL (ref 8.3–10.1)
CHLORIDE SERPL-SCNC: 109 MMOL/L (ref 100–108)
CO2 SERPL-SCNC: 28 MMOL/L (ref 21–32)
CREAT SERPL-MCNC: 0.93 MG/DL (ref 0.6–1.3)
GFR SERPL CREATININE-BSD FRML MDRD: 69 ML/MIN/1.73SQ M
GLUCOSE SERPL-MCNC: 98 MG/DL (ref 65–140)
HCT VFR BLD AUTO: 22.6 % (ref 34.8–46.1)
HGB BLD-MCNC: 7 G/DL (ref 11.5–15.4)
POTASSIUM SERPL-SCNC: 4.2 MMOL/L (ref 3.5–5.3)
SODIUM SERPL-SCNC: 143 MMOL/L (ref 136–145)

## 2019-08-18 PROCEDURE — 85014 HEMATOCRIT: CPT | Performed by: PHYSICIAN ASSISTANT

## 2019-08-18 PROCEDURE — 97530 THERAPEUTIC ACTIVITIES: CPT

## 2019-08-18 PROCEDURE — 80048 BASIC METABOLIC PNL TOTAL CA: CPT | Performed by: PHYSICIAN ASSISTANT

## 2019-08-18 PROCEDURE — 97116 GAIT TRAINING THERAPY: CPT

## 2019-08-18 PROCEDURE — 85018 HEMOGLOBIN: CPT | Performed by: PHYSICIAN ASSISTANT

## 2019-08-18 PROCEDURE — 97110 THERAPEUTIC EXERCISES: CPT

## 2019-08-18 RX ORDER — ACETAMINOPHEN 325 MG/1
650 TABLET ORAL EVERY 8 HOURS SCHEDULED
Qty: 30 TABLET | Refills: 0 | Status: SHIPPED | OUTPATIENT
Start: 2019-08-18 | End: 2021-10-27 | Stop reason: ALTCHOICE

## 2019-08-18 RX ORDER — FERROUS SULFATE 325(65) MG
325 TABLET ORAL 2 TIMES DAILY WITH MEALS
Refills: 0 | Status: SHIPPED | OUTPATIENT
Start: 2019-08-18 | End: 2021-10-27 | Stop reason: ALTCHOICE

## 2019-08-18 RX ADMIN — Medication 1 TABLET: at 07:40

## 2019-08-18 RX ADMIN — PANTOPRAZOLE SODIUM 40 MG: 40 TABLET, DELAYED RELEASE ORAL at 06:06

## 2019-08-18 RX ADMIN — ACETAMINOPHEN 650 MG: 325 TABLET ORAL at 06:06

## 2019-08-18 RX ADMIN — ASPIRIN 81 MG: 81 TABLET, COATED ORAL at 07:37

## 2019-08-18 RX ADMIN — ACETAMINOPHEN 650 MG: 325 TABLET ORAL at 14:16

## 2019-08-18 RX ADMIN — FERROUS SULFATE TAB 325 MG (65 MG ELEMENTAL FE) 325 MG: 325 (65 FE) TAB at 07:32

## 2019-08-18 RX ADMIN — OXYCODONE HYDROCHLORIDE AND ACETAMINOPHEN 250 MG: 500 TABLET ORAL at 07:39

## 2019-08-18 RX ADMIN — CELECOXIB 200 MG: 200 CAPSULE ORAL at 07:38

## 2019-08-18 RX ADMIN — TRAMADOL HYDROCHLORIDE 50 MG: 50 TABLET, FILM COATED ORAL at 07:32

## 2019-08-18 RX ADMIN — SENNOSIDES 8.6 MG: 8.6 TABLET, FILM COATED ORAL at 07:39

## 2019-08-18 RX ADMIN — DOCUSATE SODIUM 50 MG: 50 CAPSULE, LIQUID FILLED ORAL at 07:40

## 2019-08-18 NOTE — NURSING NOTE
Pt is happy to be discharged  Wants to go home  Given JESUS Null ph number to f/u on home health tomorrow, she voices understanding of this and all discharge instructions

## 2019-08-18 NOTE — DISCHARGE SUMMARY
DISCHARGE SUMMARY      Patient Name: Sunshine Johansen  Patient MRN: [id number]  Admitting Provider: Frances Gross MD  Discharging Provider: Frances Gross MD  Primary Care Physician at Discharge: Manan SanzAlexander [phone]  Admission Date: 8/16/2019       Discharge Date: 8/18/2019    Admission Diagnosis  Primary osteoarthritis of right hip [M16 11]    Discharge Diagnoses  Principal Problem:    Osteoarthritis of right hip  Resolved Problems:    * No resolved hospital problems  [facility] Course  Sunshine Johansen was admitted to [place] [address]  on 8/16/2019, with diagnosis of osteoarthritis in her Right hip  On the day of admission, she was brought to surgery, successfully underwent a Right hip replacement  She tolerated the procedure well  Following surgery, she was taken to the recovery room, at which time, there was a consult placed for Dr Julio Gallegos for the patient's medical management  After a short stay in the recovery room, she was transferred to the orthopedic floor at which time, she was resumed on her routine home medications per the hospitalist group  On postop day #1, she was able to start some physical therapy and was able to tolerate it well  At this time, she was in stable condition, showing no sign of deep vein thrombosis or pulmonary embolism  Incision was healing well at the time and showed no signs of infection  DISCHARGE DIAGNOSIS: Primary osteoarthritis of right hip [M16 11]  She is now status post Right total hip replacement, which she underwent during the hospital stay  Medications  See after visit summary for reconciled discharge medications provided to patient and family  Allergies  Allergies   Allergen Reactions    Amoxicillin Rash     Reaction Date: [id number];     Azithromycin Rash     Reaction Date: [id number]; Outpatient Follow-Up  No future appointments        Consults   Medical Management - [facility] Results   Component Value Date    HCT 22 6 (L) 08/18/2019     Lab Results   Component Value Date    GLUCOSE 139 08/30/2014    CALCIUM 8 3 08/18/2019     08/30/2014    K 4 2 08/18/2019    CO2 28 08/18/2019     (H) 08/18/2019    BUN 13 08/18/2019    CREATININE 0 93 08/18/2019        Discharge Disposition  home    Operative Procedures Performed  Procedure(s):  ARTHROPLASTY HIP TOTAL ANTERIOR    Discharge Instructions  ASA x 6 weeks for DVT prophylaxis   WBAT  Follow up x 3 weeks in the office  ?     Marylene Lemming, PA-C  8:08 AM, 8/18/2019

## 2019-08-18 NOTE — SOCIAL WORK
Called and left VM for patient to return call to tell her about [facility] accepting and [facility] 8/20

## 2019-08-18 NOTE — PLAN OF CARE
Problem: PHYSICAL THERAPY ADULT  Goal: Performs mobility at highest level of function for planned discharge setting  See evaluation for individualized goals  Description  Treatment/Interventions: Functional transfer training, LE strengthening/ROM, Elevations, Therapeutic exercise, Endurance training, Patient/family training, Equipment eval/education, Bed mobility, Gait training, Spoke to nursing          See flowsheet documentation for full assessment, interventions and recommendations  Outcome: Progressing  Note:   Prognosis: Good  Problem List: Decreased strength, Decreased range of motion, Decreased endurance, Impaired balance, Decreased mobility, Decreased skin integrity, Orthopedic restrictions, Pain  Assessment: Pt showing good progress with all aspects of mobility  Pt performs bed mobility, with supervision with use of sheet to lift R le into bed , transfers with mod I, an ambulation on levels with supervision for household distances of [de-identified]' x2  Pt performs stair climbing with supervision assist x1, and curb step climbing with use of rw with supervision assist x1 with reminders for correct le sequencing  Pt demonstrates safe stair climbing techniques  PT reports NOT needing to negotiate basement steps upon d/c to home as someone will be there to take care of the chicks down the basement  Pt acknowledges  Good understanding of car transfers, and home management techniques with use of ice, mobility and HEP  Pt is appropriate for d/c to home with family support and assistance PRN and HHPT  Barriers to Discharge Comments: Based on pt ability to meet rehab goals established  Recommendation: Home PT, Home with family support, Outpatient PT     PT - OK to Discharge: Yes    See flowsheet documentation for full assessment

## 2019-08-18 NOTE — PLAN OF CARE
Problem: PAIN - ADULT  Goal: Verbalizes/displays adequate comfort level or baseline comfort level  Description  Interventions:  - Encourage patient to monitor pain and request assistance  - Assess pain using appropriate pain scale  - Administer analgesics based on type and severity of pain and evaluate response  - Implement non-pharmacological measures as appropriate and evaluate response  - Consider cultural and social influences on pain and pain management  - Notify physician/advanced practitioner if interventions unsuccessful or patient reports new pain  Outcome: Progressing     Problem: INFECTION - ADULT  Goal: Absence or prevention of progression during hospitalization  Description  INTERVENTIONS:  - Assess and monitor for signs and symptoms of infection  - Monitor lab/diagnostic results  - Monitor all insertion sites, i e  indwelling lines, tubes, and drains  - Monitor endotracheal if appropriate and nasal secretions for changes in amount and color  - [place] appropriate cooling/warming therapies per order  - Administer medications as ordered  - Instruct and encourage patient and family to use good hand hygiene technique  - Identify and instruct in appropriate isolation precautions for identified infection/condition  Outcome: Progressing     Problem: SAFETY ADULT  Goal: Patient will remain free of falls  Description  INTERVENTIONS:  - Assess patient frequently for physical needs  -  Identify cognitive and physical deficits and behaviors that affect risk of falls    -  [place] fall precautions as indicated by assessment   - Educate patient/family on patient safety including physical limitations  - Instruct patient to call for assistance with activity based on assessment  - Modify environment to reduce risk of injury  - Consider OT/PT consult to assist with strengthening/mobility  Outcome: Progressing     Problem: DISCHARGE PLANNING  Goal: Discharge to home or other facility with appropriate resources  Description  INTERVENTIONS:  - Identify barriers to discharge w/patient and caregiver  - Arrange for needed discharge resources and transportation as appropriate  - Identify discharge learning needs (meds, wound care, etc )  - Arrange for interpretive services to assist at discharge as needed  - Refer to Case Management Department for coordinating discharge planning if the patient needs post-hospital services based on physician/advanced practitioner order or complex needs related to functional status, cognitive ability, or social support system  Outcome: Progressing     Problem: Knowledge Deficit  Goal: Patient/family/caregiver demonstrates understanding of disease process, treatment plan, medications, and discharge instructions  Description  Complete learning assessment and assess knowledge base    Interventions:  - Provide teaching at level of understanding  - Provide teaching via preferred learning methods  Outcome: Progressing     Problem: COPING  Goal: Pt/Family able to verbalize concerns and demonstrate effective coping strategies  Description  INTERVENTIONS:  - Assist patient/family to identify coping skills, available support systems and cultural and spiritual values  - Provide emotional support, including active listening and acknowledgement of concerns of patient and caregivers  - Reduce environmental stimuli, as able  - Provide patient education  - Assess for spiritual pain/suffering and initiate spiritual care, including notification of Pastoral Care or [place] based community as needed  - Assess effectiveness of coping strategies  Outcome: Progressing     Problem: GENITOURINARY - ADULT  Goal: Absence of urinary retention  Description  INTERVENTIONS:  - Assess patients ability to void and empty bladder  - Monitor I/O  - Bladder scan as needed  - Discuss with physician/AP medications to alleviate retention as needed  - Discuss catheterization for long term situations as appropriate  Outcome: Progressing     Problem: HEMATOLOGIC - ADULT  Goal: Maintains hematologic stability  Description  INTERVENTIONS  - Assess for signs and symptoms of bleeding or hemorrhage  - Monitor labs  - Administer supportive blood products/factors as ordered and appropriate  Outcome: Progressing

## 2019-08-18 NOTE — PHYSICAL THERAPY NOTE
Physical Therapy Treatment Note     08/18/19 1035   Pain Assessment   Pain Assessment 0-10   Pain Score 5   Pain Type Surgical pain   Pain Location Hip   Pain Orientation Right   Hospital Pain Intervention(s) Repositioned;Cold applied; Ambulation/increased activity; Emotional support   Response to Interventions tolerated   Restrictions/Precautions   RLE Weight Bearing Per Order WBAT   General   Chart Reviewed Yes   Family/Caregiver Present No   Cognition   Overall Cognitive Status WFL   Subjective   Subjective  I still have that stinging pain in my  R hip upon standing "   Bed Mobility   Sit to Supine 5  Supervision   Additional items Assist x 1;Leg ; Increased time required  (use of sheet to lift R le into bed)   Transfers   Sit to Stand 6  Modified independent   Additional items Assist x 1; Armrests; Increased time required   Stand to Sit 6  Modified independent   Additional items Assist x 1   Toilet transfer 6  Modified independent   Additional items Standard toilet  (grab bar)   Ambulation/Elevation   Gait pattern Forward Flexion; Short stride; Step to;Excessively slow  (progressed to step through gait  decreased heel strike/cont )   Gait Assistance 5  Supervision   Additional items Assist x 1;Verbal cues   Assistive Device Rolling walker   Distance 80' x2   Stair Management Assistance 5  Supervision   Additional items Assist x 1;Verbal cues   Stair Management Technique Two rails; Foreward;Nonreciprocal;With walker  (use of Rw for curb step)   Number of Stairs   (5 steps x2)   Curbs pt performed curb step with use of rw forwrd appraoch with supervision assist x1 with verbal cues for  le sequencing      Balance   Static Sitting Good   Dynamic Sitting Fair +   Static Standing Fair +   Dynamic Standing Fair +   Ambulatory Fair +   Endurance Deficit   Endurance Deficit Yes  (fatigue)   Activity Tolerance   Activity Tolerance Patient limited by fatigue   Medical Staff Made Aware CLEMENTINA Goss   THR Supine;AAROM;AROM; Right  (x 12 reps  AA SLr, aa hip abd /add  )   Equipment Use   Comments Pt instructed in and performed stair climbing with use of b/l rails and curb step climbing with use of rw with forwrd approach, car transfers and home management techniques discussed with pt  Assessment   Prognosis Good   Problem List Decreased strength;Decreased range of motion;Decreased endurance; Impaired balance;Decreased mobility; Decreased skin integrity;Orthopedic restrictions;Pain   Assessment Pt showing good progress with all aspects of mobility  Pt performs bed mobility, with supervision with use of sheet to lift R le into bed , transfers with mod I, an ambulation on levels with supervision for household distances of [de-identified]' x2  Pt performs stair climbing with supervision assist x1, and curb step climbing with use of rw with supervision assist x1 with reminders for correct le sequencing  Pt demonstrates safe stair climbing techniques  PT reports NOT needing to negotiate basement steps upon d/c to home as someone will be there to take care of the chicks down the basement  Pt acknowledges  Good understanding of car transfers, and home management techniques with use of ice, mobility and HEP  Pt is appropriate for d/c to home with family support and assistance PRN and HHPT  Goals   Patient Goals " to go home "   STG Expiration Date 08/23/19   Treatment Day 3   Plan   Treatment/Interventions Functional transfer training;LE strengthening/ROM; Elevations; Therapeutic exercise; Endurance training;Patient/family training;Equipment eval/education; Bed mobility;Gait training;Spoke to nursing   Progress Progressing toward goals   PT Frequency Twice a day;7x/wk   Recommendation   Recommendation Home PT; Home with family support; Outpatient PT   PT - OK to Discharge Yes        08/18/19 1035   Pain Assessment   Pain Assessment 0-10   Pain Score 5   Pain Type Surgical pain   Pain Location Hip   Pain Orientation Right   Hospital Pain Intervention(s) Repositioned;Cold applied; Ambulation/increased activity; Emotional support   Response to Interventions tolerated   Restrictions/Precautions   RLE Weight Bearing Per Order WBAT   General   Chart Reviewed Yes   Family/Caregiver Present No   Cognition   Overall Cognitive Status WFL   Subjective   Subjective  I still have that stinging pain in my  R hip upon standing "   Bed Mobility   Sit to Supine 5  Supervision   Additional items Assist x 1;Leg ; Increased time required  (use of sheet to lift R le into bed)   Transfers   Sit to Stand 6  Modified independent   Additional items Assist x 1; Armrests; Increased time required   Stand to Sit 6  Modified independent   Additional items Assist x 1   Toilet transfer 6  Modified independent   Additional items Standard toilet  (grab bar)   Ambulation/Elevation   Gait pattern Forward Flexion; Short stride; Step to;Excessively slow  (progressed to step through gait  decreased heel strike/cont )   Gait Assistance 5  Supervision   Additional items Assist x 1;Verbal cues   Assistive Device Rolling walker   Distance 80' x2   Stair Management Assistance 5  Supervision   Additional items Assist x 1;Verbal cues   Stair Management Technique Two rails; Foreward;Nonreciprocal;With walker  (use of Rw for curb step)   Number of Stairs   (5 steps x2)   Curbs pt performed curb step with use of rw forwrd appraoch with supervision assist x1 with verbal cues for  le sequencing  Balance   Static Sitting Good   Dynamic Sitting Fair +   Static Standing Fair +   Dynamic Standing Fair +   Ambulatory Fair +   Endurance Deficit   Endurance Deficit Yes  (fatigue)   Activity Tolerance   Activity Tolerance Patient limited by fatigue   Medical Staff Made Aware CLEMENTINA Mendiola Both   Exercises   THR Supine;AAROM;AROM; Right  (x 12 reps   AA SLr, aa hip abd /add  )   Equipment Use   Comments Pt instructed in and performed stair climbing with use of b/l rails and curb step climbing with use of rw with forwrd approach, car transfers and home management techniques discussed with pt  Assessment   Prognosis Good   Problem List Decreased strength;Decreased range of motion;Decreased endurance; Impaired balance;Decreased mobility; Decreased skin integrity;Orthopedic restrictions;Pain   Assessment Pt showing good progress with all aspects of mobility  Pt performs bed mobility, with supervision with use of sheet to lift R le into bed , transfers with mod I, an ambulation on levels with supervision for household distances of [de-identified]' x2  Pt performs stair climbing with supervision assist x1, and curb step climbing with use of rw with supervision assist x1 with reminders for correct le sequencing  Pt demonstrates safe stair climbing techniques  PT reports NOT needing to negotiate basement steps upon d/c to home as someone will be there to take care of the chicks down the basement  Pt acknowledges  Good understanding of car transfers, and home management techniques with use of ice, mobility and HEP  Pt is appropriate for d/c to home with family support and assistance PRN and HHPT  Goals   Patient Goals " to go home "   STG Expiration Date 08/23/19   Treatment Day 3   Plan   Treatment/Interventions Functional transfer training;LE strengthening/ROM; Elevations; Therapeutic exercise; Endurance training;Patient/family training;Equipment eval/education; Bed mobility;Gait training;Spoke to nursing   Progress Progressing toward goals   PT Frequency Twice a day;7x/wk   Recommendation   Recommendation Home PT; Home with family support; Outpatient PT   PT - OK to Discharge Yes         Song Fuentes PTA

## 2019-08-18 NOTE — PLAN OF CARE
Problem: PAIN - ADULT  Goal: Verbalizes/displays adequate comfort level or baseline comfort level  Description  Interventions:  - Encourage patient to monitor pain and request assistance  - Assess pain using appropriate pain scale  - Administer analgesics based on type and severity of pain and evaluate response  - Implement non-pharmacological measures as appropriate and evaluate response  - Consider cultural and social influences on pain and pain management  - Notify physician/advanced practitioner if interventions unsuccessful or patient reports new pain  8/18/2019 1419 by Sue Khan RN  Outcome: Adequate for Discharge  8/18/2019 1202 by Sue Khan RN  Outcome: Progressing     Problem: INFECTION - ADULT  Goal: Absence or prevention of progression during hospitalization  Description  INTERVENTIONS:  - Assess and monitor for signs and symptoms of infection  - Monitor lab/diagnostic results  - Monitor all insertion sites, i e  indwelling lines, tubes, and drains  - Monitor endotracheal if appropriate and nasal secretions for changes in amount and color  - [place] appropriate cooling/warming therapies per order  - Administer medications as ordered  - Instruct and encourage patient and family to use good hand hygiene technique  - Identify and instruct in appropriate isolation precautions for identified infection/condition  8/18/2019 1419 by Sue Khan RN  Outcome: Adequate for Discharge  8/18/2019 1202 by Sue Khan RN  Outcome: Progressing     Problem: SAFETY ADULT  Goal: Patient will remain free of falls  Description  INTERVENTIONS:  - Assess patient frequently for physical needs  -  Identify cognitive and physical deficits and behaviors that affect risk of falls    -  [place] fall precautions as indicated by assessment   - Educate patient/family on patient safety including physical limitations  - Instruct patient to call for assistance with activity based on assessment  - Modify environment to reduce risk of injury  - Consider OT/PT consult to assist with strengthening/mobility  8/18/2019 1419 by Edilia Delgado RN  Outcome: Adequate for Discharge  8/18/2019 1202 by Edilia Delgado RN  Outcome: Progressing     Problem: DISCHARGE PLANNING  Goal: Discharge to home or other facility with appropriate resources  Description  INTERVENTIONS:  - Identify barriers to discharge w/patient and caregiver  - Arrange for needed discharge resources and transportation as appropriate  - Identify discharge learning needs (meds, wound care, etc )  - Arrange for interpretive services to assist at discharge as needed  - Refer to Case Management Department for coordinating discharge planning if the patient needs post-hospital services based on physician/advanced practitioner order or complex needs related to functional status, cognitive ability, or social support system  8/18/2019 1419 by Edilia Delgado RN  Outcome: Adequate for Discharge  8/18/2019 1202 by Edilia Delgado RN  Outcome: Progressing     Problem: Knowledge Deficit  Goal: Patient/family/caregiver demonstrates understanding of disease process, treatment plan, medications, and discharge instructions  Description  Complete learning assessment and assess knowledge base    Interventions:  - Provide teaching at level of understanding  - Provide teaching via preferred learning methods  8/18/2019 1419 by Edilia Delgado RN  Outcome: Adequate for Discharge  8/18/2019 1202 by Edilia Delgado RN  Outcome: Progressing     Problem: COPING  Goal: Pt/Family able to verbalize concerns and demonstrate effective coping strategies  Description  INTERVENTIONS:  - Assist patient/family to identify coping skills, available support systems and cultural and spiritual values  - Provide emotional support, including active listening and acknowledgement of concerns of patient and caregivers  - Reduce environmental stimuli, as able  - Provide patient education  - Assess for spiritual pain/suffering and initiate spiritual care, including notification of Pastoral Care or [place] based community as needed  - Assess effectiveness of coping strategies  8/18/2019 1419 by Lisa Montana RN  Outcome: Adequate for Discharge  8/18/2019 1202 by Lisa Montana RN  Outcome: Progressing     Problem: GENITOURINARY - ADULT  Goal: Absence of urinary retention  Description  INTERVENTIONS:  - Assess patients ability to void and empty bladder  - Monitor I/O  - Bladder scan as needed  - Discuss with physician/AP medications to alleviate retention as needed  - Discuss catheterization for long term situations as appropriate  8/18/2019 1203 by Lisa Montana RN  Outcome: Adequate for Discharge  8/18/2019 1202 by Lisa Montana RN  Outcome: Progressing     Problem: HEMATOLOGIC - ADULT  Goal: Maintains hematologic stability  Description  INTERVENTIONS  - Assess for signs and symptoms of bleeding or hemorrhage  - Monitor labs  - Administer supportive blood products/factors as ordered and appropriate  8/18/2019 1419 by Lisa Montana RN  Outcome: Adequate for Discharge  8/18/2019 1202 by Lisa Mnotana RN  Outcome: Progressing

## 2019-08-18 NOTE — PROGRESS NOTES
Orthopedic Total Hip Progress Note - Dr Lorri Padilla total hip arthroplasty  Systemic or Specific Complaints: No Complaints    Objective     Vital signs in last 24 hours:  Temp:  [97 9 °F (36 6 °C)-101 °F (38 3 °C)] 99 3 °F (37 4 °C)  HR:  [] 72  Resp:  [18-20] 20  BP: (105-128)/(55-75) 126/75    Neurovascular: Capillary refill: Normal  Dorsiflexion: 5/5  Plantarflexion:  5/5   Wound: Dressing:  clean/dry/intact   DVT Exam: No evidence of DVT seen on physical exam   Negative Jeffery's sign  No cords or calf tenderness  No significant calf/ankle edema  Data Review:  CBC:   Lab Results   Component Value Date    WBC 5 71 08/16/2019    WBC 5 57 08/12/2014    RBC 2 70 (L) 08/16/2019    RBC 4 42 08/12/2014    HGB 7 0 (L) 08/18/2019    HGB 10 3 (L) 08/30/2014    HCT 22 6 (L) 08/18/2019    HCT 31 5 (L) 08/30/2014     08/16/2019     08/12/2014       Assessment/Plan     Status post-Right total hip arthroplasty: Doing well postoperatively  Plan for dc today       Continues current post-op course    Activity: up with assistance      Gerda Easton PA-C    Date: 8/18/2019  Time: 8:00 AM

## 2019-08-18 NOTE — SOCIAL WORK
Patient is interested in the meds to bed program when CM offered it  CM reviewed d/c planning process including the following: identifying help at home, patient preference for d/c planning needs,  [facility] Meds to Bed program, availability of treatment team to discuss questions or concerns patient and/or family may have regarding understanding medications and recognizing signs and symptoms once discharged  CM also encouraged patient to follow up with all recommended appointments after discharge  Patient advised of importance for patient and family to participate in managing patients medical well being

## 2019-08-18 NOTE — PROGRESS NOTES
Progress Note - Internal Medicine   Baldomero Sommer 53 y o  female MRN: [id number]  Unit/Bed#: E2 -01 Encounter: [id number]      Impression :    Post op  Day # 2, right total hip replacement by Dr Susie Childers  Advanced end-stage osteoarthritis right hip, failed conservative treatments  Previous history of left knee replacement, 09/02/2014  Left knee patellofemoral DJD  Obesity with BMI 30 22  Acute blood loss anemia, hemoglobin 8 2 -->>7 3 --->>7 0    Recommendation:    Patient is recuperating well following her surgery  She will continue her OT and PT  We had a very long discussion with the patient again today regarding her declining hemoglobin  Hemoglobin has dropped down to 7 0  I offered patient to get blood transfusion besides her getting iron tablets and ascorbic acid  Patient was very clear and does not want blood transfusion  She stated that she is not Protestant but she does not want to take blood  She stated that she has good  She indicates to me that she has discussed with her daughter who is a nurse and her other daughter was in the room also  We reviewed again possible complication she can have including syncope gray out TIA stroke MI etc but she was not keen stated that she does not want to take her risk of any but his blood transfusion  After having discussion with the patient then her son-in-law also arrived probably about half an hour thereafter and he stated that he has a universal donor and would like to give blood  Patient was again question if she would like to blood transfusion she clearly stated no  She stated that she would like to go home will have her self recheck by her primary care physician where she lives I have advised her to recheck her hemoglobin again probably within next 1-2 days and if it is significantly lower than now or remains the same and has any symptoms then she should return back for blood transfusion    She fully understood the instructions and I have also advised her to be cautious with regards to sudden postural changes which can make her dizzy lightheaded, if she notices any blood loss from any other site she will need to address that through her primary care physician  Her stool occult should be checked again for any other site of blood loss  Patient is very keen on leaving today does not want to stay for any further additional testing at present  Continue close follow-up with the primary care physician and orthopedic doctors  Subjective:     Patient seen and examined today  EMR and overnight events reviewed  Patient is resting comfortably not in any distress  She is pale but does not have any symptoms of dizziness lightheadedness  She denies any bleeding from her surgery site  She participated with therapy team and is keen on going home  She has refused blood transfusions does not want any other parenteral infusions  Denies any bleeding from any site  Review of Systems     Constitutional: Denies appetite change  No chills, diaphoresis, fatigue or fever  HEENT: No congestion, sore throat  No visual complaints  Respiratory: No cough, chest tightness, shortness of breath and wheezing  Cardiovascular: No chest pain and palpitations  No Syncope or grey out  GI: Negative for abdominal distention, pain, constipation, diarrhea or nausea  Endocrine: Negative for cold or heat intolerance, polydipsia and polyuria  Genitourinary: Negative for decreased urine volume, dysuria, flank pain and urgency  Skin: Negative for rash  Neurological: Negative for dizziness, weakness, numbness and headaches  Hematological: Negative for spontaneous bruising or bleeding  Psychiatric: Negative for confusion, dysphoric mood, hallucinations  All other systems reviewed and are negative         OBJECTIVE:     Vitals:     Temp:  [97 9 °F (36 6 °C)-101 °F (38 3 °C)] 98 7 °F (37 1 °C)  HR:  [] 87  Resp:  [14-20] 14  BP: (105-128)/(55-75) 108/57  SpO2:  [96 %-100 %] 99 %  Temp (24hrs), Av 3 °F (37 4 °C), Min:97 9 °F (36 6 °C), Max:101 °F (38 3 °C)  Current: Temperature: 98 7 °F (37 1 °C)    Intake/Output Summary (Last 24 hours) at 2019 1506  Last data filed at 2019 1454  Gross per 24 hour   Intake 5662 5 ml   Output 1400 ml   Net 4262 5 ml     Body mass index is 30 22 kg/m²          Physical Exam    Patient is awake and alert cooperative not in any distress  Pallor 1+  JVP cyanosis are negative  Bilateral lungs are clear no rales or crackles  Regular S1 and S2 no murmurs  Abdomen is soft and nontender  Right hip clean dressing no discharge no drainage no swelling  Homans sign is negative without any calf tenderness  Neurological intact without any focal deficits  Mood is stable pleasant  Skin dry and warm without any rashes    Labs, Imaging, & Other studies:    All pertinent labs and imaging studies were personally reviewed  Results from last 7 days   Lab Units 19  0425 19  0453 19  1039   WBC Thousand/uL  --   --  5 71   HEMOGLOBIN g/dL 7 0* 7 3* 8 2*   PLATELETS Thousands/uL  --   --  182     Results from last 7 days   Lab Units 19  0425   POTASSIUM mmol/L 4 2   CHLORIDE mmol/L 109*   CO2 mmol/L 28   BUN mg/dL 13   CREATININE mg/dL 0 93   EGFR ml/min/1 73sq m 69   CALCIUM mg/dL 8 3           Current Meds:  Current Facility-Administered Medications   Medication Dose Route Frequency Provider Last Rate Last Dose    acetaminophen (TYLENOL) tablet 650 mg  650 mg Oral [facility] Twyla Burk MD   650 mg at 19 1416    ascorbic acid (VITAMIN C) tablet 250 mg  250 mg Oral Daily Twyla Burk MD   250 mg at 19 0739    aspirin (ECOTRIN LOW STRENGTH) EC tablet 81 mg  81 mg Oral BID Zachary Spatz, PA-C   81 mg at 19 0737    calcium carbonate (TUMS) chewable tablet 1,000 mg  1,000 mg Oral Daily PRN Zachary Spatz, PA-C        celecoxib (CeleBREX) capsule 200 mg  200 mg Oral Daily Mark Anthony Correa PA-C   200 mg at 08/18/19 0738    docusate sodium (COLACE) capsule 50 mg  50 mg Oral Daily Gabo Nieves MD   50 mg at 08/18/19 0740    ferrous sulfate tablet 325 mg  325 mg Oral BID With Meals Gabo Nieves MD   325 mg at 08/18/19 0732    HYDROmorphone (DILAUDID) injection 0 2 mg  0 2 mg Intravenous Q3H PRN [place] Lips, PA-C        ketorolac (TORADOL) injection 30 mg  30 mg Intravenous Q6H PRN [place] Lips, PA-C   30 mg at 08/16/19 1440    lactated ringers infusion  125 mL/hr Intravenous Continuous Lorie Lips, PA-C   Stopped at 08/18/19 8227    methocarbamol (ROBAXIN) tablet 500 mg  500 mg Oral Q6H PRN Lorie Lips, PA-C        multivitamin-minerals (CENTRUM) tablet 1 tablet  1 tablet Oral Daily [place] Lips, PA-C   1 tablet at 08/18/19 0740    ondansetron (ZOFRAN) injection 4 mg  4 mg Intravenous Q6H PRN [place] Lips, PA-C        oxyCODONE (ROXICODONE) immediate release tablet 10 mg  10 mg Oral Q4H PRN Mark Anthony FLOR Correa-DINORAH        oxyCODONE (ROXICODONE) IR tablet 5 mg  5 mg Oral Q4H PRN [place] Lips, PA-C        pantoprazole (PROTONIX) EC tablet 40 mg  40 mg Oral Daily Mark Anthony FLOR Correa-C   40 mg at 08/18/19 0606    senna (SENOKOT) tablet 8 6 mg  1 tablet Oral Daily [place] Lips, PA-C   8 6 mg at 08/18/19 0739    sodium chloride 0 9 % infusion  125 mL/hr Intravenous Continuous Violetta Mejia DO   Stopped at 08/18/19 0659    traMADol (ULTRAM) tablet 50 mg  50 mg Oral Q6H PRN [place] Lips, PA-C   50 mg at 08/18/19 9617     Current Outpatient Medications   Medication Sig Dispense Refill    acetaminophen (TYLENOL) 325 mg tablet Take 2 tablets (650 mg total) by mouth every 8 (eight) hours 30 tablet 0    aspirin (ECOTRIN LOW STRENGTH) 81 mg EC tablet Take 1 tablet (81 mg total) by mouth 2 (two) times a day 90 tablet 0    celecoxib (CeleBREX) 200 mg capsule Take 1 capsule (200 mg total) by mouth daily 30 capsule 0    [START ON 8/19/2019] docusate sodium (COLACE) 50 mg capsule Take 1 capsule (50 mg total) by mouth daily 10 capsule 0    ferrous sulfate 325 (65 Fe) mg tablet Take 1 tablet (325 mg total) by mouth 2 (two) times a day with meals  0    methocarbamol (ROBAXIN) 500 mg tablet Take 1 tablet (500 mg total) by mouth every 6 (six) hours as needed for muscle spasms 40 tablet 0    oxyCODONE (ROXICODONE) 5 mg immediate release tablet Take 1-2 tablets (5-10 mg total) by mouth every 6 (six) hours as needed for moderate pain for up to 10 daysMax Daily Amount: 40 mg 60 tablet 0    traMADol (ULTRAM) 50 mg tablet Take 1 tablet (50 mg total) by mouth every 6 (six) hours as needed for moderate pain for up to 10 days 40 tablet 0           VTE Pharmacologic Prophylaxis:  as per Primary Ortho Team   VTE Mechanical Prophylaxis: sequential compression device      Counseling / Coordination of Care    Total time spent today 42 minutes  Greater than 50% of total time was spent with the patient and her family members counseling especially with regards to benefits and risk of blood transfusion  Recommended blood transfusion to her and even her family members including her son in law wanted her to have it but she was not keen and did not give us consent for the same  I had to come twice, 1st when she was with her daughter and then later on when her son-in-law came and he wanted to review this with her and me together  Portions of the record may have been created with voice recognition software  Occasional wrong word or "sound a like" substitutions may have occurred due to the inherent limitations of voice recognition software  Read the chart carefully and recognize, using context, where substitutions have occurred

## 2019-08-18 NOTE — SOCIAL WORK
CM met with patient she lives alone [place] with 1STE  Patient has two daughters that will drive her home and stay with her while she recovers  Patient works from home 2 days a week and commutes 2 hours three days a week  Patient was independent Pta  She has rolling walker at bedside and declined a BSC  Hx of VNA but does not remember which agency she used 5 years ago  Cm offered choice of VNA patient offered no preference  Cm sent referral to [address] awaiting acceptance

## 2019-08-19 ENCOUNTER — TRANSITIONAL CARE MANAGEMENT (OUTPATIENT)
Dept: FAMILY MEDICINE CLINIC | Facility: CLINIC | Age: 54
End: 2019-08-19

## 2019-08-19 NOTE — UTILIZATION REVIEW
Initial Clinical Review    Elective IP  surgical procedure  Age/Sex: 53 y o  female  Surgery Date: 8/16/2019  Procedure: Right Hip Replacement   Anesthesia: Spinal    Admission Orders: Date/Time/Statement: 8/16/19 @ 1005   Orders Placed This Encounter   Procedures    Inpatient Admission     Standing Status:   Standing     Number of Occurrences:   1     Order Specific Question:   Admitting Physician     Answer:   Teri López     Order Specific Question:   Level of Care     Answer:   Med Surg [16]     Order Specific Question:   Estimated length of stay     Answer:   Inpatient Only Surgery     Vital Signs: /57 (BP Location: Right arm)   Pulse 87   Temp 98 7 °F (37 1 °C) (Oral)   Resp 14   Ht 5' 4 5" (1 638 m)   Wt 81 1 kg (178 lb 12 7 oz)   SpO2 99%   BMI 30 22 kg/m²       08/17/19 1109  98 °F (36 7 °C)  91  18 109/57 99 % None (Room air)   08/17/19 0759  97 6 °F (36 4 °C)  91  18 113/59 99 % None (Room air)   08/16/19 2300  98 7 °F (37 1 °C)  77  16 98/60 99 % None (Room air)   08/16/19 1900  97 9 °F (36 6 °C)  87  16 112/66 100 % None (Room air)   08/16/19 1500  97 5 °F (36 4 °C)  77  18 116/63 99 % --       Basic metabolic panel    Ref Range & Units 8/17/19 0453   Sodium 136 - 145 mmol/L 144    Potassium 3 5 - 5 3 mmol/L 4 4    Chloride 100 - 108 mmol/L 111High     CO2 21 - 32 mmol/L 27    ANION GAP 4 - 13 mmol/L 6    BUN 5 - 25 mg/dL 14    Creatinine 0 60 - 1 30 mg/dL 0 82    Glucose 65 - 140 mg/dL 98    Calcium 8 3 - 10 1 mg/dL 8 4    eGFR ml/min/1 73sq m 81             Ref Range & Units 8/17/19 0453 8/16/19 1039   Hemoglobin 11 5 - 15 4 g/dL 7 3Low   8 2Low     Hematocrit 34 8 - 46 1 % 23 2Low   26 1Low                 8/17 POD #1  Status post-Right total hip arthroplasty  Acute blood loss anemia, hemoglobin 8 2 -->>7 3 with episode of lightheadedness this am  Pt refusing Bloos TX @ present & requests to wait till tomorrow to recheck her hemoglobin  Continue pain control, close clinical monitoring and reassess within next 24 hours  Patient is not ready for discharge  Diet: REGULAR  Mobility:  WBAT ; ambuate q shift starting POD #1  DVT Prophylaxis: SCD's  Medications/Pain Control:   Current Facility-Administered Medications:  acetaminophen 650 mg Oral Q8H [address]    ascorbic acid 250 mg Oral Daily    aspirin 81 mg Oral BID    calcium carbonate 1,000 mg Oral Daily PRN    celecoxib 200 mg Oral Daily    docusate sodium 50 mg Oral Daily    ferrous sulfate 325 mg Oral BID With Meals    HYDROmorphone 0 2 mg Intravenous Q3H PRN    ketorolac 30 mg Intravenous Q6H PRN  IV x 1 8/16   lactated ringers 125 mL/hr Intravenous Continuous    methocarbamol 500 mg Oral Q6H PRN    multivitamin-minerals 1 tablet Oral Daily    ondansetron 4 mg Intravenous Q6H PRN    oxyCODONE 10 mg Oral Q4H PRN    oxyCODONE 5 mg Oral Q4H PRN    pantoprazole 40 mg Oral Daily    senna 1 tablet Oral Daily    traMADol 50 mg Oral Q6H PRN  x 1 8/17       Network Utilization Review Department  Phone: [phone]; Fax [phone]  [email or web address]  ATTENTION: Please call with any questions or concerns to [phone]  and carefully listen to the prompts so that you are directed to the right person  Send all requests for admission clinical reviews, approved or denied determinations and any other requests to fax [phone]   All voicemails are confidential

## 2019-08-27 ENCOUNTER — OFFICE VISIT (OUTPATIENT)
Dept: FAMILY MEDICINE CLINIC | Facility: CLINIC | Age: 54
End: 2019-08-27
Payer: COMMERCIAL

## 2019-08-27 VITALS
RESPIRATION RATE: 16 BRPM | HEIGHT: 65 IN | BODY MASS INDEX: 28.82 KG/M2 | SYSTOLIC BLOOD PRESSURE: 128 MMHG | TEMPERATURE: 99.6 F | HEART RATE: 80 BPM | WEIGHT: 173 LBS | DIASTOLIC BLOOD PRESSURE: 78 MMHG

## 2019-08-27 DIAGNOSIS — M16.11 OSTEOARTHRITIS OF RIGHT HIP, UNSPECIFIED OSTEOARTHRITIS TYPE: ICD-10-CM

## 2019-08-27 DIAGNOSIS — Z23 IMMUNIZATION DUE: ICD-10-CM

## 2019-08-27 DIAGNOSIS — D64.9 POSTOPERATIVE ANEMIA: Primary | ICD-10-CM

## 2019-08-27 PROCEDURE — 99495 TRANSJ CARE MGMT MOD F2F 14D: CPT | Performed by: FAMILY MEDICINE

## 2019-08-27 NOTE — PATIENT INSTRUCTIONS
Iron over the counter could help you recover from the blood loss faster  You could take Ferrous sulfate 325mg = Elemental Iron 65mg, between 1 to 3 per day but not enough to cause constipation  SHINGRIX is the new, more effective vaccine for Shingles  It is more than 90% effective  You should check with your local pharmacy and insurance company for availability and coverage  It is a 2 shot series, with the second shot given between 2-6 months after the first, and is approved for ages 48 and up

## 2019-08-27 NOTE — PROGRESS NOTES
Assessment/Plan:    No problem-specific Assessment & Plan notes found for this encounter  S/p THR, incision appears clean w/o sign of infection  Recheck iron and cbc since had anemia at dc, she reports she refused transfusion on day of DC  May need po iron supplementation, understands dosing  Pain is controlled  No fever or symptoms of uti  Cont home PT    Interested in checking immune status of measles/mumps  labslip given  She will check with her insurance coverage     Diagnoses and all orders for this visit:    Postoperative anemia  -     CBC and differential; Future  -     Iron Saturation %; Future    Osteoarthritis of right hip, unspecified osteoarthritis type    Immunization due  -     Mumps antibody, IgG; Future  -     Rubeola antibody IgG; Future              No follow-ups on file  Subjective:      Patient ID: Kiel Montanez is a 53 y o  female  Chief Complaint   Patient presents with    Transition of Care Management     [facility]       HPI  TCM Call (since 7/27/2019)     Date and time call was made  8/19/2019  9:10 AM    Hospital care reviewed  Records reviewed    [place]  [facility]    Date of Admission  08/16/19    Date of discharge  08/18/19    Diagnosis  Osteoarthritis of right hip    Disposition  Home    Were the patients medications reviewed and updated  Yes    Current Symptoms  None      TCM Call (since 7/27/2019)     Post hospital issues  None    Should patient be enrolled in anticoag monitoring? No    Scheduled for follow up?   Yes    Patients specialists  Other (comment)    Other specialists names  Dr Jefferson Baird     Did you obtain your prescribed medications  Yes    Do you need help managing your prescriptions or medications  No    Is transportation to your appointment needed  Yes    Specify why  Due to recent hip replacement    I have advised the patient to call PCP with any new or worsening symptoms  [address] [facility]    The type of support provided  Physical; Emotional    Do you have social support  Yes, as much as I need    Are you recieving any outpatient services  No    Are you recieving home care services  Yes    Types of home care services  Nurse visit; Home PT    Have you fallen in the last 12 months  No    Interperter language line needed  No    Counseling  Patient    Counseling topics  Activities of daily living; Importance of RX compliance; patient and family education; instructions for management; Risk factor reduction; Home health agency benefits    Comments  Venessa Barber is doing fine and states that her pain is controlled  She knows to go to the ER if any severe pain in her leg, calf/foot, chest pain, dypsnea, weakness, dizziness, palpitations, etc  Her Hgb was 7 upon discharge  Visiting nurses are set up to draw her labs tomorrow  She is ambulating with a walker at home and doing fine  She will call with any questions or concerns and knows to call her surgeon if any fevers or s/s of infection at her surgical site/ S/S of infection reviewed Lesley Vail        S/p left hip 2014  S/p right THR  No plan for knees yet  Rheumatologist in past  Also hands  hgb went from 12 preop to 7 on day of dc    Has vna  Has home PT also 2x/w at home  Asa 325mg/d for 6w    No fever  No probs with dysuria  Wound is ok per pt  No oozing  No odor  Showers daily but keeping wound dry  No sob or weakness or lightheadedness  Has tramadol    HOMEBOUND QUALIFICATIONS REVIEW:  >>Cannot leave home without considerable taxing effort   y  >>Requires the aid of supportive devices(wheelchair or walker)   y  >>Requires the use of special transportation   n  >>Needs the assistance of another person   y  >>Has a condition that leaving the home is medically contraindicated   n  >>Symptoms of the disease process worsen when leaving the home   n  >>Has a skilled need requiring a medical professional  y, PT and wound care    The following portions of the patient's history were reviewed and updated as appropriate: allergies, current medications, past family history, past medical history, past social history, past surgical history and problem list     Review of Systems   Respiratory: Negative for shortness of breath  Cardiovascular: Negative for chest pain  Neurological: Negative for light-headedness  Current Outpatient Medications   Medication Sig Dispense Refill    aspirin (ECOTRIN LOW STRENGTH) 81 mg EC tablet Take 1 tablet (81 mg total) by mouth 2 (two) times a day 90 tablet 0    celecoxib (CeleBREX) 200 mg capsule Take 1 capsule (200 mg total) by mouth daily 30 capsule 0    methocarbamol (ROBAXIN) 500 mg tablet Take 1 tablet (500 mg total) by mouth every 6 (six) hours as needed for muscle spasms 40 tablet 0    acetaminophen (TYLENOL) 325 mg tablet Take 2 tablets (650 mg total) by mouth every 8 (eight) hours (Patient not taking: Reported on 8/27/2019) 30 tablet 0    docusate sodium (COLACE) 50 mg capsule Take 1 capsule (50 mg total) by mouth daily (Patient not taking: Reported on 8/27/2019) 10 capsule 0    ferrous sulfate 325 (65 Fe) mg tablet Take 1 tablet (325 mg total) by mouth 2 (two) times a day with meals (Patient not taking: Reported on 8/27/2019)  0    oxyCODONE (ROXICODONE) 5 mg immediate release tablet Take 1-2 tablets (5-10 mg total) by mouth every 6 (six) hours as needed for moderate pain for up to 10 daysMax Daily Amount: 40 mg (Patient not taking: Reported on 8/19/2019) 60 tablet 0    traMADol (ULTRAM) 50 mg tablet Take 1 tablet (50 mg total) by mouth every 6 (six) hours as needed for moderate pain for up to 10 days (Patient not taking: Reported on 8/27/2019) 40 tablet 0     No current facility-administered medications for this visit          Objective:    /78   Pulse 80   Temp 99 6 °F (37 6 °C) Resp 16   Ht 5' 4 5" (1 638 m)   Wt 78 5 kg (173 lb)   BMI 29 24 kg/m²        Physical Exam   Constitutional: She appears well-developed  No distress  HENT:   Head: Normocephalic  Right Ear: External ear normal    Left Ear: External ear normal    Mouth/Throat: Oropharynx is clear and moist  No oropharyngeal exudate  Eyes: Conjunctivae are normal  No scleral icterus  Neck: Neck supple  Cardiovascular: Normal rate, regular rhythm and normal heart sounds  Pulmonary/Chest: Effort normal  No respiratory distress  She has no rales  Abdominal: Soft  She exhibits no distension  Musculoskeletal: She exhibits no edema or deformity  Neurological: She is alert  Skin: Skin is warm and dry  No rash noted  No erythema  No pallor  Psychiatric: Her behavior is normal  Thought content normal    Nursing note and vitals reviewed               Bentley Ang DO

## 2019-08-29 DIAGNOSIS — D64.9 POSTOPERATIVE ANEMIA: Primary | ICD-10-CM

## 2019-08-29 LAB
BASOPHILS # BLD AUTO: 0.1 X10E3/UL (ref 0–0.2)
BASOPHILS NFR BLD AUTO: 1 %
EOSINOPHIL # BLD AUTO: 0.4 X10E3/UL (ref 0–0.4)
EOSINOPHIL NFR BLD AUTO: 7 %
ERYTHROCYTE [DISTWIDTH] IN BLOOD BY AUTOMATED COUNT: 14.6 % (ref 12.3–15.4)
HCT VFR BLD AUTO: 28.3 % (ref 34–46.6)
HGB BLD-MCNC: 9 G/DL (ref 11.1–15.9)
IMM GRANULOCYTES # BLD: 0 X10E3/UL (ref 0–0.1)
IMM GRANULOCYTES NFR BLD: 1 %
IRON SATN MFR SERPL: 10 % (ref 15–55)
IRON SERPL-MCNC: 29 UG/DL (ref 27–159)
LYMPHOCYTES # BLD AUTO: 1.9 X10E3/UL (ref 0.7–3.1)
LYMPHOCYTES NFR BLD AUTO: 29 %
MCH RBC QN AUTO: 29.3 PG (ref 26.6–33)
MCHC RBC AUTO-ENTMCNC: 31.8 G/DL (ref 31.5–35.7)
MCV RBC AUTO: 92 FL (ref 79–97)
MEV IGG SER IA-ACNC: 183 AU/ML
MONOCYTES # BLD AUTO: 0.6 X10E3/UL (ref 0.1–0.9)
MONOCYTES NFR BLD AUTO: 9 %
MUV IGG SER IA-ACNC: 278 AU/ML
NEUTROPHILS # BLD AUTO: 3.6 X10E3/UL (ref 1.4–7)
NEUTROPHILS NFR BLD AUTO: 53 %
PLATELET # BLD AUTO: 541 X10E3/UL (ref 150–450)
RBC # BLD AUTO: 3.07 X10E6/UL (ref 3.77–5.28)
TIBC SERPL-MCNC: 290 UG/DL (ref 250–450)
UIBC SERPL-MCNC: 261 UG/DL (ref 131–425)
WBC # BLD AUTO: 6.6 X10E3/UL (ref 3.4–10.8)

## 2019-09-17 ENCOUNTER — HOSPITAL ENCOUNTER (OUTPATIENT)
Dept: MAMMOGRAPHY | Facility: CLINIC | Age: 54
Discharge: HOME | End: 2019-09-17
Payer: COMMERCIAL

## 2019-09-17 DIAGNOSIS — Z12.31 SCREENING MAMMOGRAM, ENCOUNTER FOR: ICD-10-CM

## 2019-09-17 PROCEDURE — 77067 SCR MAMMO BI INCL CAD: CPT

## 2019-09-17 PROCEDURE — 77063 BREAST TOMOSYNTHESIS BI: CPT

## 2019-09-18 ENCOUNTER — OFFICE VISIT (OUTPATIENT)
Dept: FAMILY MEDICINE CLINIC | Facility: CLINIC | Age: 54
End: 2019-09-18
Payer: COMMERCIAL

## 2019-09-18 ENCOUNTER — HOSPITAL ENCOUNTER (OUTPATIENT)
Dept: ULTRASOUND IMAGING | Facility: HOSPITAL | Age: 54
Discharge: HOME | End: 2019-09-18
Payer: COMMERCIAL

## 2019-09-18 ENCOUNTER — TELEPHONE (OUTPATIENT)
Dept: FAMILY MEDICINE CLINIC | Facility: CLINIC | Age: 54
End: 2019-09-18

## 2019-09-18 VITALS
WEIGHT: 169.2 LBS | BODY MASS INDEX: 28.19 KG/M2 | TEMPERATURE: 98.8 F | OXYGEN SATURATION: 99 % | RESPIRATION RATE: 16 BRPM | DIASTOLIC BLOOD PRESSURE: 92 MMHG | HEIGHT: 65 IN | HEART RATE: 72 BPM | SYSTOLIC BLOOD PRESSURE: 148 MMHG

## 2019-09-18 DIAGNOSIS — M79.89 PAIN AND SWELLING OF LEFT LOWER EXTREMITY: Primary | ICD-10-CM

## 2019-09-18 DIAGNOSIS — M79.605 PAIN AND SWELLING OF LEFT LOWER EXTREMITY: ICD-10-CM

## 2019-09-18 DIAGNOSIS — M79.605 PAIN AND SWELLING OF LEFT LOWER EXTREMITY: Primary | ICD-10-CM

## 2019-09-18 DIAGNOSIS — M79.89 PAIN AND SWELLING OF LEFT LOWER EXTREMITY: ICD-10-CM

## 2019-09-18 PROCEDURE — 3008F BODY MASS INDEX DOCD: CPT | Performed by: FAMILY MEDICINE

## 2019-09-18 PROCEDURE — 99213 OFFICE O/P EST LOW 20 MIN: CPT | Performed by: FAMILY MEDICINE

## 2019-09-18 PROCEDURE — 93971 EXTREMITY STUDY: CPT | Performed by: SURGERY

## 2019-09-18 PROCEDURE — 93971 EXTREMITY STUDY: CPT

## 2019-09-18 RX ORDER — CEPHALEXIN 500 MG/1
CAPSULE ORAL
COMMUNITY

## 2019-09-18 NOTE — PROGRESS NOTES
Diagnoses and all orders for this visit:    Pain and swelling of left lower extremity  -     VAS lower limb venous duplex study, unilateral/limited; Future  - Given risk factors of recent surgery and family history of DVTs in patient's mother, will order stat doppler to assess for DVT  Subjective:      Patient ID: Laverna Peabody is a 53 y o  female  HPI   Jennifer Simmons presents today for c/o left calf pain and swelling which she initially noticed 2 weeks ago  Felt a "pop" in her left calf  The pain went away on its own after a couple of days  Then this past weekend, noted that while she was walking the pain returned  8/10 intensity, sharp, radiating to her ankles when it began  Gradually improving  Daughter noticed some swelling in the left calf compared to the right calf  Pt recently underwent a right hip replacement on 8/16/19 and is currently on baby aspirin  She does have a family history of DVTs in her mother  Denies chest pain, shortness of breath, palpitations, fatigue, weakness, fevers, chills, redness in her legs  The following portions of the patient's history were reviewed and updated as appropriate: allergies, current medications, past family history, past medical history, past social history, past surgical history and problem list     Review of Systems   Constitutional: Negative for activity change, appetite change, chills, diaphoresis, fatigue and fever  HENT: Negative  Respiratory: Negative for cough, choking, chest tightness, shortness of breath and wheezing  Cardiovascular: Positive for leg swelling (left calf)  Negative for chest pain and palpitations  Gastrointestinal: Negative for abdominal distention, abdominal pain, constipation, diarrhea, nausea and vomiting  Genitourinary: Negative for difficulty urinating, dyspareunia, dysuria, enuresis, flank pain, frequency, menstrual problem, pelvic pain and urgency     Musculoskeletal: Negative for arthralgias, back pain, gait problem, joint swelling, myalgias, neck pain and neck stiffness  Left calf pain   Skin: Negative  Neurological: Negative for dizziness, tremors, syncope, facial asymmetry, weakness, light-headedness, numbness and headaches  Psychiatric/Behavioral: Negative  Objective:      /92   Pulse 72   Temp 98 8 °F (37 1 °C)   Resp 16   Ht 5' 4 5" (1 638 m)   Wt 76 7 kg (169 lb 3 2 oz)   SpO2 99%   BMI 28 59 kg/m²          Physical Exam   Constitutional: She is oriented to person, place, and time  She appears well-developed and well-nourished  No distress  HENT:   Head: Normocephalic and atraumatic  Cardiovascular: Normal rate, regular rhythm, normal heart sounds and intact distal pulses  Exam reveals no gallop and no friction rub  No murmur heard  Pulmonary/Chest: Effort normal and breath sounds normal  No stridor  No respiratory distress  She has no wheezes  She has no rales  She exhibits no tenderness  Musculoskeletal: She exhibits edema (very mild swelling in left lower extermity compared to right lower extremity) and tenderness (left calf)  She exhibits no deformity  Neurological: She is alert and oriented to person, place, and time  Skin: She is not diaphoretic

## 2020-03-04 ENCOUNTER — OFFICE VISIT (OUTPATIENT)
Dept: URGENT CARE | Facility: MEDICAL CENTER | Age: 55
End: 2020-03-04
Payer: COMMERCIAL

## 2020-03-04 VITALS
HEIGHT: 65 IN | BODY MASS INDEX: 29.22 KG/M2 | HEART RATE: 109 BPM | RESPIRATION RATE: 18 BRPM | WEIGHT: 175.4 LBS | TEMPERATURE: 100.6 F | OXYGEN SATURATION: 99 %

## 2020-03-04 DIAGNOSIS — J02.9 ACUTE VIRAL PHARYNGITIS: Primary | ICD-10-CM

## 2020-03-04 DIAGNOSIS — J02.9 ACUTE PHARYNGITIS, UNSPECIFIED ETIOLOGY: ICD-10-CM

## 2020-03-04 LAB — S PYO AG THROAT QL: NEGATIVE

## 2020-03-04 PROCEDURE — 87070 CULTURE OTHR SPECIMN AEROBIC: CPT | Performed by: PHYSICIAN ASSISTANT

## 2020-03-04 PROCEDURE — 87880 STREP A ASSAY W/OPTIC: CPT | Performed by: PHYSICIAN ASSISTANT

## 2020-03-04 PROCEDURE — 99213 OFFICE O/P EST LOW 20 MIN: CPT | Performed by: PHYSICIAN ASSISTANT

## 2020-03-05 NOTE — PATIENT INSTRUCTIONS
Pharyngitis  Salt water gargles  Follow up with PCP in 3-5 days  Proceed to  ER if symptoms worsen  Pharyngitis   WHAT YOU NEED TO KNOW:   Pharyngitis, or sore throat, is inflammation of the tissues and structures in your pharynx (throat)  Pharyngitis is most often caused by bacteria  It may also be caused by a cold or flu virus  Other causes include smoking, allergies, or acid reflux  DISCHARGE INSTRUCTIONS:   Call 911 for any of the following:   · You have trouble breathing or swallowing because your throat is swollen or sore  Return to the emergency department if:   · You are drooling because it hurts too much to swallow  · Your fever is higher than 102? F (39?C) or lasts longer than 3 days  · You are confused  · You taste blood in your throat  Contact your healthcare provider if:   · Your throat pain gets worse  · You have a painful lump in your throat that does not go away after 5 days  · Your symptoms do not improve after 5 days  · You have questions or concerns about your condition or care  Medicines:  Viral pharyngitis will go away on its own without treatment  Your sore throat should start to feel better in 3 to 5 days for both viral and bacterial infections  You may need any of the following:  · Antibiotics  treat a bacterial infection  · NSAIDs , such as ibuprofen, help decrease swelling, pain, and fever  NSAIDs can cause stomach bleeding or kidney problems in certain people  If you take blood thinner medicine, always ask your healthcare provider if NSAIDs are safe for you  Always read the medicine label and follow directions  · Acetaminophen  decreases pain and fever  It is available without a doctor's order  Ask how much to take and how often to take it  Follow directions  Acetaminophen can cause liver damage if not taken correctly  · Take your medicine as directed  Contact your healthcare provider if you think your medicine is not helping or if you have side effects  Tell him or her if you are allergic to any medicine  Keep a list of the medicines, vitamins, and herbs you take  Include the amounts, and when and why you take them  Bring the list or the pill bottles to follow-up visits  Carry your medicine list with you in case of an emergency  Manage your symptoms:   · Gargle salt water  Mix ¼ teaspoon salt in an 8 ounce glass of warm water and gargle  This may help decrease swelling in your throat  · Drink liquids as directed  You may need to drink more liquids than usual  Liquids may help soothe your throat and prevent dehydration  Ask how much liquid to drink each day and which liquids are best for you  · Use a cool-steam humidifier  to help moisten the air in your room and calm your cough  · Soothe your throat  with cough drops, ice, soft foods, or popsicles  Prevent the spread of pharyngitis:  Cover your mouth and nose when you cough or sneeze  Do not share food or drinks  Wash your hands often  Use soap and water  If soap and water are unavailable, use an alcohol based hand   Follow up with your healthcare provider as directed:  Write down your questions so you remember to ask them during your visits  © 2017 [facility] Information is for End User's use only and may not be sold, redistributed or otherwise used for commercial purposes  All illustrations and images included in CareNotes® are the copyrighted property of [employer]  or Haile Zambrano  The above information is an  only  It is not intended as medical advice for individual conditions or treatments  Talk to your doctor, nurse or pharmacist before following any medical regimen to see if it is safe and effective for you

## 2020-03-06 LAB — BACTERIA THROAT CULT: NORMAL

## 2020-12-08 DIAGNOSIS — Z12.31 SCREENING MAMMOGRAM, ENCOUNTER FOR: Primary | ICD-10-CM

## 2020-12-30 ENCOUNTER — HOSPITAL ENCOUNTER (OUTPATIENT)
Dept: MAMMOGRAPHY | Facility: HOSPITAL | Age: 55
Discharge: HOME | End: 2020-12-30
Payer: COMMERCIAL

## 2020-12-30 VITALS — BODY MASS INDEX: 28.32 KG/M2 | HEIGHT: 65 IN | WEIGHT: 170 LBS

## 2020-12-30 DIAGNOSIS — Z12.31 SCREENING MAMMOGRAM, ENCOUNTER FOR: ICD-10-CM

## 2020-12-30 PROCEDURE — 77063 BREAST TOMOSYNTHESIS BI: CPT

## 2020-12-30 PROCEDURE — 77067 SCR MAMMO BI INCL CAD: CPT

## 2021-09-20 ENCOUNTER — VBI (OUTPATIENT)
Dept: ADMINISTRATIVE | Facility: OTHER | Age: 56
End: 2021-09-20

## 2021-10-27 ENCOUNTER — OFFICE VISIT (OUTPATIENT)
Dept: FAMILY MEDICINE CLINIC | Facility: CLINIC | Age: 56
End: 2021-10-27
Payer: COMMERCIAL

## 2021-10-27 VITALS
TEMPERATURE: 97.8 F | BODY MASS INDEX: 30.32 KG/M2 | RESPIRATION RATE: 18 BRPM | DIASTOLIC BLOOD PRESSURE: 86 MMHG | SYSTOLIC BLOOD PRESSURE: 118 MMHG | OXYGEN SATURATION: 95 % | WEIGHT: 177.6 LBS | HEIGHT: 64 IN | HEART RATE: 87 BPM

## 2021-10-27 DIAGNOSIS — Z11.4 SCREENING FOR HIV (HUMAN IMMUNODEFICIENCY VIRUS): ICD-10-CM

## 2021-10-27 DIAGNOSIS — Z13.29 SCREENING FOR THYROID DISORDER: ICD-10-CM

## 2021-10-27 DIAGNOSIS — Z23 NEED FOR VACCINATION: ICD-10-CM

## 2021-10-27 DIAGNOSIS — Z13.89 SCREENING FOR CARDIOVASCULAR, RESPIRATORY, AND GENITOURINARY DISEASES: ICD-10-CM

## 2021-10-27 DIAGNOSIS — Z12.31 ENCOUNTER FOR SCREENING MAMMOGRAM FOR MALIGNANT NEOPLASM OF BREAST: ICD-10-CM

## 2021-10-27 DIAGNOSIS — Z13.83 SCREENING FOR CARDIOVASCULAR, RESPIRATORY, AND GENITOURINARY DISEASES: ICD-10-CM

## 2021-10-27 DIAGNOSIS — Z00.00 WELL ADULT EXAM: Primary | ICD-10-CM

## 2021-10-27 DIAGNOSIS — Z13.6 SCREENING FOR CARDIOVASCULAR, RESPIRATORY, AND GENITOURINARY DISEASES: ICD-10-CM

## 2021-10-27 DIAGNOSIS — Z11.59 ENCOUNTER FOR HEPATITIS C SCREENING TEST FOR LOW RISK PATIENT: ICD-10-CM

## 2021-10-27 PROCEDURE — 90715 TDAP VACCINE 7 YRS/> IM: CPT

## 2021-10-27 PROCEDURE — 99396 PREV VISIT EST AGE 40-64: CPT | Performed by: FAMILY MEDICINE

## 2021-10-27 PROCEDURE — 3725F SCREEN DEPRESSION PERFORMED: CPT | Performed by: FAMILY MEDICINE

## 2021-10-27 PROCEDURE — 90472 IMMUNIZATION ADMIN EACH ADD: CPT

## 2021-10-27 PROCEDURE — 3008F BODY MASS INDEX DOCD: CPT | Performed by: FAMILY MEDICINE

## 2021-10-27 PROCEDURE — 1036F TOBACCO NON-USER: CPT | Performed by: FAMILY MEDICINE

## 2021-10-27 PROCEDURE — 90682 RIV4 VACC RECOMBINANT DNA IM: CPT

## 2021-10-27 PROCEDURE — 90471 IMMUNIZATION ADMIN: CPT

## 2021-12-31 LAB
ALBUMIN SERPL-MCNC: 4.3 G/DL (ref 3.8–4.9)
ALBUMIN/GLOB SERPL: 1.6 {RATIO} (ref 1.2–2.2)
ALP SERPL-CCNC: 108 IU/L (ref 44–121)
ALT SERPL-CCNC: 21 IU/L (ref 0–32)
AST SERPL-CCNC: 23 IU/L (ref 0–40)
BASOPHILS # BLD AUTO: 0.1 X10E3/UL (ref 0–0.2)
BASOPHILS NFR BLD AUTO: 2 %
BILIRUB SERPL-MCNC: 0.3 MG/DL (ref 0–1.2)
BUN SERPL-MCNC: 26 MG/DL (ref 6–24)
BUN/CREAT SERPL: 30 (ref 9–23)
CALCIUM SERPL-MCNC: 9.7 MG/DL (ref 8.7–10.2)
CHLORIDE SERPL-SCNC: 103 MMOL/L (ref 96–106)
CHOLEST SERPL-MCNC: 230 MG/DL (ref 100–199)
CO2 SERPL-SCNC: 24 MMOL/L (ref 20–29)
CREAT SERPL-MCNC: 0.87 MG/DL (ref 0.57–1)
EOSINOPHIL # BLD AUTO: 0.3 X10E3/UL (ref 0–0.4)
EOSINOPHIL NFR BLD AUTO: 6 %
ERYTHROCYTE [DISTWIDTH] IN BLOOD BY AUTOMATED COUNT: 13 % (ref 11.7–15.4)
GLOBULIN SER-MCNC: 2.7 G/DL (ref 1.5–4.5)
GLUCOSE SERPL-MCNC: 86 MG/DL (ref 65–99)
HCT VFR BLD AUTO: 37.7 % (ref 34–46.6)
HCV AB S/CO SERPL IA: <0.1 S/CO RATIO (ref 0–0.9)
HDLC SERPL-MCNC: 87 MG/DL
HGB BLD-MCNC: 12.4 G/DL (ref 11.1–15.9)
HIV 1+2 AB+HIV1 P24 AG SERPL QL IA: NON REACTIVE
IMM GRANULOCYTES # BLD: 0 X10E3/UL (ref 0–0.1)
IMM GRANULOCYTES NFR BLD: 0 %
LDLC SERPL CALC-MCNC: 131 MG/DL (ref 0–99)
LYMPHOCYTES # BLD AUTO: 1.5 X10E3/UL (ref 0.7–3.1)
LYMPHOCYTES NFR BLD AUTO: 33 %
MCH RBC QN AUTO: 30 PG (ref 26.6–33)
MCHC RBC AUTO-ENTMCNC: 32.9 G/DL (ref 31.5–35.7)
MCV RBC AUTO: 91 FL (ref 79–97)
MICRODELETION SYND BLD/T FISH: NORMAL
MONOCYTES # BLD AUTO: 0.5 X10E3/UL (ref 0.1–0.9)
MONOCYTES NFR BLD AUTO: 10 %
NEUTROPHILS # BLD AUTO: 2.3 X10E3/UL (ref 1.4–7)
NEUTROPHILS NFR BLD AUTO: 49 %
PLATELET # BLD AUTO: 306 X10E3/UL (ref 150–450)
POTASSIUM SERPL-SCNC: 5.1 MMOL/L (ref 3.5–5.2)
PROT SERPL-MCNC: 7 G/DL (ref 6–8.5)
RBC # BLD AUTO: 4.13 X10E6/UL (ref 3.77–5.28)
SL AMB EGFR AFRICAN AMERICAN: 86 ML/MIN/1.73
SL AMB EGFR NON AFRICAN AMERICAN: 74 ML/MIN/1.73
SL AMB VLDL CHOLESTEROL CALC: 12 MG/DL (ref 5–40)
SODIUM SERPL-SCNC: 141 MMOL/L (ref 134–144)
TRIGL SERPL-MCNC: 68 MG/DL (ref 0–149)
TSH SERPL DL<=0.005 MIU/L-ACNC: 1.89 UIU/ML (ref 0.45–4.5)
WBC # BLD AUTO: 4.7 X10E3/UL (ref 3.4–10.8)

## 2022-01-03 ENCOUNTER — TELEPHONE (OUTPATIENT)
Dept: FAMILY MEDICINE CLINIC | Facility: CLINIC | Age: 57
End: 2022-01-03

## 2022-01-04 NOTE — TELEPHONE ENCOUNTER
Patient called and results discussed  Patient cholesterol and LDL elevated than normal and denies any family h/o premature CAD and stroke  The nature of cardiac risk has been fully discussed with this patient  I have made her aware of her LDL target goal given her cardiovascular risk analysis  I have discussed the appropriate diet  The need for lifelong compliance in order to reduce risk is stressed  A regular exercise program is recommended to help achieve and maintain normal body weight, fitness and improve lipid balance  Discussed with patient her GFR being in stage 2 CKD but creatinine is normal at this time and avoid excessive use of NSAIDs and stay hydrated    RYAN Chong

## 2022-01-05 ENCOUNTER — HOSPITAL ENCOUNTER (OUTPATIENT)
Dept: MAMMOGRAPHY | Facility: HOSPITAL | Age: 57
Discharge: HOME | End: 2022-01-05
Payer: COMMERCIAL

## 2022-01-05 VITALS — HEIGHT: 64 IN | WEIGHT: 177 LBS | BODY MASS INDEX: 30.22 KG/M2

## 2022-01-05 DIAGNOSIS — Z12.31 ENCOUNTER FOR SCREENING MAMMOGRAM FOR MALIGNANT NEOPLASM OF BREAST: ICD-10-CM

## 2022-01-05 PROCEDURE — 77063 BREAST TOMOSYNTHESIS BI: CPT

## 2022-01-05 PROCEDURE — 77067 SCR MAMMO BI INCL CAD: CPT

## 2022-07-06 ENCOUNTER — VBI (OUTPATIENT)
Dept: ADMINISTRATIVE | Facility: OTHER | Age: 57
End: 2022-07-06

## 2023-01-16 ENCOUNTER — PATIENT MESSAGE (OUTPATIENT)
Dept: FAMILY MEDICINE CLINIC | Facility: CLINIC | Age: 58
End: 2023-01-16

## 2023-01-16 DIAGNOSIS — Z12.31 ENCOUNTER FOR SCREENING MAMMOGRAM FOR MALIGNANT NEOPLASM OF BREAST: Primary | ICD-10-CM

## 2023-01-26 ENCOUNTER — TELEPHONE (OUTPATIENT)
Dept: FAMILY MEDICINE CLINIC | Facility: CLINIC | Age: 58
End: 2023-01-26

## 2023-01-26 NOTE — TELEPHONE ENCOUNTER
----- Message from Aren Pabon sent at 1/26/2023  7:21 AM EST -----  Regarding: shingles  Contact: [phone]  I was ill over the weekend with what seemed like the flu, body aches and fever  On Monday it developed into a head cold, but on Tuesday I woke up with shingles  Should I make an appointment to be seen or will I be fine toughing it out?

## 2023-01-26 NOTE — TELEPHONE ENCOUNTER
Since she is having transportation issues please offer her a virtual appointment  Thank you,  Dr Jewell Dear

## 2023-01-26 NOTE — TELEPHONE ENCOUNTER
Patient called back and stated she will either call back and schedule or go to the walk in as she does not have her car at the moment      No further action needed

## 2023-01-27 ENCOUNTER — TELEMEDICINE (OUTPATIENT)
Dept: FAMILY MEDICINE CLINIC | Facility: CLINIC | Age: 58
End: 2023-01-27

## 2023-01-27 VITALS — WEIGHT: 173 LBS | BODY MASS INDEX: 29.53 KG/M2 | HEIGHT: 64 IN

## 2023-01-27 DIAGNOSIS — B02.9 HERPES ZOSTER WITHOUT COMPLICATION: Primary | ICD-10-CM

## 2023-01-27 RX ORDER — VALACYCLOVIR HYDROCHLORIDE 1 G/1
1000 TABLET, FILM COATED ORAL 3 TIMES DAILY
Qty: 21 TABLET | Refills: 0 | Status: SHIPPED | OUTPATIENT
Start: 2023-01-27 | End: 2023-02-03

## 2023-01-27 NOTE — PROGRESS NOTES
Virtual Regular Visit    Verification of patient location:    Patient is located in the following state in which I hold an active license [place]      Assessment/Plan:    Problem List Items Addressed This Visit    None  Visit Diagnoses     Herpes zoster without complication    -  Primary    we discussed using gabapentin for pain, but she declines at this time  will use advil for pain control    Relevant Medications    valACYclovir (VALTREX) 1,000 mg tablet         Return if symptoms worsen or fail to improve  Reason for visit is   Chief Complaint   Patient presents with   • Herpes Zoster     Possible shingles on left hip nm lpn   • Virtual Regular Visit        Encounter provider Chucky Degroot DO    Provider located at  [address] [place] [id number]      Recent Visits  Date Type Provider Dept   01/26/23 Telephone [facility], [address] recent visits within past 7 days and meeting all other requirements  Today's Visits  Date Type Provider Dept   01/27/23 [facility] [facility], [address] today's visits and meeting all other requirements  Future Appointments  No visits were found meeting these conditions  Showing future appointments within next 150 days and meeting all other requirements       The patient was identified by name and date of birth  Boris Sutherland was informed that this is a telemedicine visit and that the visit is being conducted through the e[employer] Aid  She agrees to proceed     My office door was closed  No one else was in the room  She acknowledged consent and understanding of privacy and security of the video platform  The patient has agreed to participate and understands they can discontinue the visit at any time  Patient is aware this is a billable service  Subjective  Boris Sutherland is a 61 y o  female    She had a cold last weekend    Then woke up with pain on her side 3 days ago  The rash wraps around her left side  It feels like being stung by bees  Past Medical History:   Diagnosis Date   • Arthritis     hands   • Contact lens overwear of both eyes    • Wears glasses        Past Surgical History:   Procedure Laterality Date   • BONE MARROW ASPIRATION     • CHOLECYSTECTOMY     • HAND SURGERY Left     thumb   • HIP SURGERY Left 2014   • SC ARTHRP ACETBLR/PROX FEM PROSTC AGRFT/ALGRFT Right 8/16/2019    Procedure: ARTHROPLASTY HIP TOTAL ANTERIOR;  Surgeon: Greg Mims MD;  Location: [facility];  Service: Orthopedics   • SC COLONOSCOPY FLX DX W/COLLJ SPEC WHEN PFRMD N/A 5/7/2019    Procedure: COLONOSCOPY;  Surgeon: Ariela Valle MD;  Location: [facility]; Service: Gastroenterology   • TONSILLECTOMY     • TYMPANOSTOMY TUBE PLACEMENT         Current Outpatient Medications   Medication Sig Dispense Refill   • valACYclovir (VALTREX) 1,000 mg tablet Take 1 tablet (1,000 mg total) by mouth 3 (three) times a day for 7 days 21 tablet 0     No current facility-administered medications for this visit  Allergies   Allergen Reactions   • Amoxicillin Rash     Reaction Date: [id number];    • Azithromycin Rash     Reaction Date: [id number]; Review of Systems    Video Exam    Vitals:    01/27/23 0815   Weight: 78 5 kg (173 lb)   Height: 5' 4" (1 626 m)       Physical Exam  Vitals reviewed  Constitutional:       General: She is not in acute distress  Pulmonary:      Effort: Pulmonary effort is normal    Skin:     Findings: Rash (wrapping around left lower abdomen, bumpy and vesicular in nature) present  Neurological:      Mental Status: She is alert  Psychiatric:         Behavior: Behavior normal          Thought Content:  Thought content normal          Judgment: Judgment normal           I spent 8 minutes directly with the patient during this visit

## 2023-02-16 ENCOUNTER — HOSPITAL ENCOUNTER (OUTPATIENT)
Dept: MAMMOGRAPHY | Facility: HOSPITAL | Age: 58
End: 2023-02-16

## 2023-02-16 VITALS — BODY MASS INDEX: 28.17 KG/M2 | HEIGHT: 64 IN | WEIGHT: 165 LBS

## 2023-02-16 DIAGNOSIS — Z12.31 ENCOUNTER FOR SCREENING MAMMOGRAM FOR MALIGNANT NEOPLASM OF BREAST: ICD-10-CM

## 2023-03-01 ENCOUNTER — VBI (OUTPATIENT)
Dept: ADMINISTRATIVE | Facility: OTHER | Age: 58
End: 2023-03-01

## 2024-01-19 ENCOUNTER — TELEPHONE (OUTPATIENT)
Dept: FAMILY MEDICINE CLINIC | Facility: CLINIC | Age: 59
End: 2024-01-19

## 2024-01-19 ENCOUNTER — PATIENT MESSAGE (OUTPATIENT)
Dept: FAMILY MEDICINE CLINIC | Facility: CLINIC | Age: 59
End: 2024-01-19

## 2024-01-19 DIAGNOSIS — Z00.6 ENCOUNTER FOR EXAMINATION FOR NORMAL COMPARISON OR CONTROL IN CLINICAL RESEARCH PROGRAM: ICD-10-CM

## 2024-01-19 DIAGNOSIS — Z12.31 SCREENING MAMMOGRAM, ENCOUNTER FOR: Primary | ICD-10-CM

## 2024-02-20 ENCOUNTER — HOSPITAL ENCOUNTER (OUTPATIENT)
Dept: MAMMOGRAPHY | Facility: HOSPITAL | Age: 59
Discharge: HOME | End: 2024-02-20
Payer: COMMERCIAL

## 2024-02-20 DIAGNOSIS — Z12.31 SCREENING MAMMOGRAM, ENCOUNTER FOR: ICD-10-CM

## 2024-02-20 PROCEDURE — 77063 BREAST TOMOSYNTHESIS BI: CPT

## 2024-02-20 PROCEDURE — 77067 SCR MAMMO BI INCL CAD: CPT

## 2024-02-21 PROBLEM — Z12.11 SPECIAL SCREENING FOR MALIGNANT NEOPLASMS, COLON: Status: RESOLVED | Noted: 2019-04-16 | Resolved: 2024-02-21

## 2025-04-17 DIAGNOSIS — Z12.31 ENCOUNTER FOR SCREENING MAMMOGRAM FOR BREAST CANCER: ICD-10-CM

## 2025-05-16 ENCOUNTER — HOSPITAL ENCOUNTER (OUTPATIENT)
Dept: MAMMOGRAPHY | Facility: HOSPITAL | Age: 60
End: 2025-05-16
Payer: COMMERCIAL

## 2025-05-16 DIAGNOSIS — Z12.31 ENCOUNTER FOR SCREENING MAMMOGRAM FOR BREAST CANCER: ICD-10-CM

## 2025-05-16 PROCEDURE — 77067 SCR MAMMO BI INCL CAD: CPT

## 2025-05-16 PROCEDURE — 77063 BREAST TOMOSYNTHESIS BI: CPT

## 2025-05-21 ENCOUNTER — RESULTS FOLLOW-UP (OUTPATIENT)
Dept: FAMILY MEDICINE CLINIC | Facility: CLINIC | Age: 60
End: 2025-05-21

## (undated) DEVICE — SKIN MARKER DUAL TIP WITH RULER CAP, FLEXIBLE RULER AND LABELS: Brand: DEVON

## (undated) DEVICE — ANTIBACTERIAL UNDYED BRAIDED (POLYGLACTIN 910), SYNTHETIC ABSORBABLE SUTURE: Brand: COATED VICRYL

## (undated) DEVICE — COBAN 6 IN STERILE

## (undated) DEVICE — ADHESIVE SKN CLSR HISTOACRYL FLEX 0.5ML LF

## (undated) DEVICE — DRAPE SHEET X-LG

## (undated) DEVICE — PENCIL ELECTROSURG E-Z CLEAN -0035H

## (undated) DEVICE — VEST SURGEON DISPOSABLE

## (undated) DEVICE — GAMMEX® NON-LATEX SENSITIVE SIZE 7.5, STERILE NEOPRENE POWDER-FREE SURGICAL GLOVE: Brand: GAMMEX

## (undated) DEVICE — BIPOLAR SEALER 23-113-1 AQM 2.3: Brand: AQUAMANTYS™

## (undated) DEVICE — GLOVE SRG BIOGEL 7.5

## (undated) DEVICE — ARTHROSCOPY FLOOR MAT

## (undated) DEVICE — WEBRIL 6 IN UNSTERILE

## (undated) DEVICE — 450 ML BOTTLE OF 0.05% CHLORHEXIDINE GLUCONATE IN 99.95% STERILE WATER FOR IRRIGATION, USP AND APPLICATOR.: Brand: IRRISEPT ANTIMICROBIAL WOUND LAVAGE

## (undated) DEVICE — THE SIMPULSE SOLO SYSTEM WITH ULTREX RETRACTABLE SPLASH SHIELD TIP: Brand: SIMPULSE SOLO

## (undated) DEVICE — WET SKIN PREP TRAY: Brand: MEDLINE INDUSTRIES, INC.

## (undated) DEVICE — SILVER-COATED ANTIMICROBIAL BARRIER DRESSING: Brand: ACTICOAT FLEX7 4" X 5"

## (undated) DEVICE — PROXIMATE SKIN STAPLERS (35 WIDE) CONTAINS 35 STAINLESS STEEL STAPLES (FIXED HEAD): Brand: PROXIMATE

## (undated) DEVICE — 3M™ TEGADERM™ TRANSPARENT FILM DRESSING FRAME STYLE, 1627, 4 IN X 10 IN (10 CM X 25 CM), 20/CT 4CT/CASE: Brand: 3M™ TEGADERM™

## (undated) DEVICE — CAPIT HIP COP -CERAMIC ON POLY

## (undated) DEVICE — CHLORAPREP HI-LITE 26ML ORANGE

## (undated) DEVICE — GLOVE INDICATOR PI UNDERGLOVE SZ 8 BLUE

## (undated) DEVICE — PACK UNIVERSAL DRAPES SUB-Q ICD

## (undated) DEVICE — GLOVE SRG BIOGEL 8

## (undated) DEVICE — GLOVE SRG BIOGEL 8.5

## (undated) DEVICE — ELECTRODE BLADE E-Z CLEAN 6.5IN -0014

## (undated) DEVICE — STRL ALLENTOWN HIP SHOULDER PK: Brand: CARDINAL HEALTH

## (undated) DEVICE — SUT VICRYL PLUS 1 CTX 36 IN VCP371H

## (undated) DEVICE — NEEDLE 18 G X 1 1/2

## (undated) DEVICE — PREP SURGICAL PURPREP 26ML

## (undated) DEVICE — GLOVE INDICATOR PI UNDERGLOVE SZ 8.5 BLUE

## (undated) DEVICE — 10FR FRAZIER SUCTION HANDLE: Brand: CARDINAL HEALTH

## (undated) DEVICE — 3M™ IOBAN™ 2 ANTIMICROBIAL INCISE DRAPE 6640EZ: Brand: IOBAN™ 2

## (undated) DEVICE — POSITIONER HANA TABLE PACK

## (undated) DEVICE — 3000CC GUARDIAN II: Brand: GUARDIAN

## (undated) DEVICE — HOOD: Brand: FLYTE

## (undated) DEVICE — INTENDED FOR TISSUE SEPARATION, AND OTHER PROCEDURES THAT REQUIRE A SHARP SURGICAL BLADE TO PUNCTURE OR CUT.: Brand: BARD-PARKER ® CARBON RIB-BACK BLADES

## (undated) DEVICE — TOWEL SURG XR DETECT GREEN STRL RFD

## (undated) DEVICE — GLOVE SRG BIOGEL ORTHOPEDIC 7.5

## (undated) DEVICE — SYRINGE 30ML LL

## (undated) DEVICE — DRAPE EQUIPMENT RF WAND

## (undated) DEVICE — DRAPE C-ARM X-RAY

## (undated) DEVICE — SAW BLADE OSCILLATING BRAZOL 167

## (undated) DEVICE — PINNACLE POROCOAT ACETABULAR SHELL SECTOR II 56MM OD
Type: IMPLANTABLE DEVICE | Site: HIP | Status: NON-FUNCTIONAL
Brand: PINNACLE POROCOAT

## (undated) DEVICE — PINNACLE CANCELLOUS BONE SCREW 6.5MM X 25MM
Type: IMPLANTABLE DEVICE | Site: HIP | Status: NON-FUNCTIONAL
Brand: PINNACLE

## (undated) DEVICE — SCD SEQUENTIAL COMPRESSION COMFORT SLEEVE MEDIUM KNEE LENGTH: Brand: KENDALL SCD

## (undated) DEVICE — SURGICAL GOWN, XL SMARTSLEEVE: Brand: CONVERTORS

## (undated) DEVICE — INTENDED FOR TISSUE SEPARATION, AND OTHER PROCEDURES THAT REQUIRE A SHARP SURGICAL BLADE TO PUNCTURE OR CUT.: Brand: BARD-PARKER SAFETY BLADES SIZE 10, STERILE

## (undated) DEVICE — TRAY FOLEY 16FR URIMETER SURESTEP